# Patient Record
Sex: FEMALE | Race: WHITE | NOT HISPANIC OR LATINO | Employment: UNEMPLOYED | URBAN - METROPOLITAN AREA
[De-identification: names, ages, dates, MRNs, and addresses within clinical notes are randomized per-mention and may not be internally consistent; named-entity substitution may affect disease eponyms.]

---

## 2017-02-02 DIAGNOSIS — Z00.00 ROUTINE HEALTH MAINTENANCE: Primary | ICD-10-CM

## 2017-02-07 ENCOUNTER — CLINICAL SUPPORT (OUTPATIENT)
Dept: FAMILY MEDICINE CLINIC | Facility: CLINIC | Age: 37
End: 2017-02-07

## 2017-02-07 DIAGNOSIS — Z00.00 ROUTINE HEALTH MAINTENANCE: Primary | ICD-10-CM

## 2017-02-07 PROCEDURE — 93000 ELECTROCARDIOGRAM COMPLETE: CPT | Performed by: INTERNAL MEDICINE

## 2017-02-07 PROCEDURE — 85025 COMPLETE CBC W/AUTO DIFF WBC: CPT | Performed by: INTERNAL MEDICINE

## 2017-02-08 LAB
ALBUMIN SERPL-MCNC: 4.9 G/DL (ref 3.5–5.2)
ALBUMIN/GLOB SERPL: 2.1 G/DL
ALP SERPL-CCNC: 42 U/L (ref 39–117)
ALT SERPL-CCNC: 18 U/L (ref 1–33)
AST SERPL-CCNC: 24 U/L (ref 1–32)
BILIRUB SERPL-MCNC: 0.6 MG/DL (ref 0.1–1.2)
BUN SERPL-MCNC: 15 MG/DL (ref 6–20)
BUN/CREAT SERPL: 19 (ref 7–25)
CALCIUM SERPL-MCNC: 9.6 MG/DL (ref 8.6–10.5)
CHLORIDE SERPL-SCNC: 103 MMOL/L (ref 98–107)
CHOLEST SERPL-MCNC: 223 MG/DL (ref 0–200)
CO2 SERPL-SCNC: 26.5 MMOL/L (ref 22–29)
CREAT SERPL-MCNC: 0.79 MG/DL (ref 0.57–1)
GLOBULIN SER CALC-MCNC: 2.3 GM/DL
GLUCOSE SERPL-MCNC: 84 MG/DL (ref 65–99)
GLUCOSE UR QL: NORMAL
HDLC SERPL-MCNC: 97 MG/DL (ref 40–60)
KETONES UR QL STRIP: NORMAL
LDLC SERPL CALC-MCNC: 114 MG/DL (ref 0–100)
LDLC/HDLC SERPL: 1.18 {RATIO}
PH UR STRIP: NORMAL [PH]
POTASSIUM SERPL-SCNC: 4.4 MMOL/L (ref 3.5–5.2)
PROT SERPL-MCNC: 7.2 G/DL (ref 6–8.5)
PROT UR QL STRIP: NORMAL
REQUEST PROBLEM: NORMAL
SODIUM SERPL-SCNC: 143 MMOL/L (ref 136–145)
SP GR UR: NORMAL
TRIGL SERPL-MCNC: 59 MG/DL (ref 0–150)
VLDLC SERPL CALC-MCNC: 11.8 MG/DL (ref 5–40)

## 2017-02-14 ENCOUNTER — OFFICE VISIT (OUTPATIENT)
Dept: FAMILY MEDICINE CLINIC | Facility: CLINIC | Age: 37
End: 2017-02-14

## 2017-02-14 VITALS
TEMPERATURE: 98.2 F | RESPIRATION RATE: 16 BRPM | HEIGHT: 64 IN | DIASTOLIC BLOOD PRESSURE: 64 MMHG | WEIGHT: 130 LBS | OXYGEN SATURATION: 95 % | SYSTOLIC BLOOD PRESSURE: 116 MMHG | BODY MASS INDEX: 22.2 KG/M2 | HEART RATE: 68 BPM

## 2017-02-14 DIAGNOSIS — Z00.00 WELL ADULT EXAM: ICD-10-CM

## 2017-02-14 DIAGNOSIS — G89.29 CHRONIC PAIN OF LEFT KNEE: Primary | ICD-10-CM

## 2017-02-14 DIAGNOSIS — M25.562 CHRONIC PAIN OF LEFT KNEE: Primary | ICD-10-CM

## 2017-02-14 PROCEDURE — 99395 PREV VISIT EST AGE 18-39: CPT | Performed by: INTERNAL MEDICINE

## 2017-02-14 NOTE — PROGRESS NOTES
Subjective   Bernice Anderson is a 36 y.o. female. Patient is here today for   Chief Complaint   Patient presents with   • Annual Exam     PHYSICAL- has obgyn           Vitals:    02/14/17 1100   BP: 116/64   Pulse: 68   Resp: 16   Temp: 98.2 °F (36.8 °C)   SpO2: 95%       The following portions of the patient's history were reviewed and updated as appropriate: allergies, current medications, past family history, past medical history, past social history, past surgical history and problem list.    Past Medical History   Diagnosis Date   • Acne    • Amenorrhea       No Known Allergies   Social History     Social History   • Marital status:      Spouse name: N/A   • Number of children: N/A   • Years of education: N/A     Occupational History   • Not on file.     Social History Main Topics   • Smoking status: Never Smoker   • Smokeless tobacco: Not on file   • Alcohol use Yes   • Drug use: Not on file   • Sexual activity: Not on file     Other Topics Concern   • Not on file     Social History Narrative   • No narrative on file        Current Outpatient Prescriptions:   •  albuterol (PROVENTIL HFA;VENTOLIN HFA) 108 (90 BASE) MCG/ACT inhaler, ProAir  (90 Base) MCG/ACT Inhalation Aerosol Solution; Patient Sig: ProAir  (90 Base) MCG/ACT Inhalation Aerosol Solution INHALE 1 TO 2 PUFFS EVERY 4 TO 6 HOURS AS NEEDED.; 1; 3; 09-Apr-2015; Active; or formulary specific, Disp: , Rfl:   •  Calcium Carbonate (CALCIUM 600 PO), Take  by mouth., Disp: , Rfl:   •  fluticasone-salmeterol (ADVAIR) 100-50 MCG/DOSE DISKUS, Inhale 2 (Two) Times a Day., Disp: , Rfl:   •  Prenatal Vit-DSS-Fe Cbn-FA (PRENATAL AD) tablet, Take by mouth., Disp: , Rfl:   •  methylPREDNISolone (MEDROL DOSEPACK) 4 MG tablet, Per pack directions, Disp: 21 tablet, Rfl: 0  •  metroNIDAZOLE (METROGEL) 0.75 % gel, Apply topically., Disp: , Rfl:   •  promethazine-codeine (PHENERGAN with CODEINE) 6.25-10 MG/5ML syrup, TAKE 5 MLS BY MOUTH EVERY 6 HOURS  AS NEEDED FOR COUGH, Disp: 120 mL, Rfl: 0     EKG  ECG Report  Gunner her cardiogram shows a normal sinus rhythm with a regular rate.  She does not have any ST or T wave changes suggestive of ischemia.    She has a normal axis.    A PA and lateral chest x-ray were not indicated so they weren't done today.  Objective   HPI Comments: This 36-year-old female is new to my practice today.  She is to see Dr Muhammad.  She is here today for comprehensive physical exam.  Dr. Muhammad  moved her practice and the patient finds it is no longer convenient for her to follow-up with her.     Bernice Anderson 36 y.o. female who presents for an Annual Wellness Visit.  she has a history of   Patient Active Problem List   Diagnosis   • Allergic rhinitis   • Asthma, cough variant   • Well adult exam   .  she has been doing well with new interval problems.  Labs results discussed in detail with the patient.  Plan to update vaccines if needed today.        Lab Results (most recent)     None            Review of Systems   Constitutional: Negative.    HENT: Negative.    Respiratory: Negative.    Cardiovascular: Negative.    Genitourinary: Negative.    Musculoskeletal: Negative.         She complains of left knee pain for about a month.  She denies any crepitus in the knee.  One point she had a large amount of swelling in the popliteal fossa in this area but that seems to resolve.  She does not think that she's had any fluid on the knee per se.       Skin: Negative.    Allergic/Immunologic: Negative.    Neurological: Negative.    Hematological: Negative.    Psychiatric/Behavioral: Negative.        Physical Exam   Constitutional: She is oriented to person, place, and time. She appears well-developed and well-nourished. No distress.   HENT:   Head: Normocephalic and atraumatic.   Right Ear: External ear normal.   Left Ear: External ear normal.   Nose: Nose normal.   Mouth/Throat: Oropharynx is clear and moist. No oropharyngeal exudate.   Eyes:  Conjunctivae and EOM are normal. Pupils are equal, round, and reactive to light. Right eye exhibits no discharge. Left eye exhibits no discharge. No scleral icterus.   Neck: Normal range of motion. Neck supple. No JVD present. No tracheal deviation present. No thyromegaly present.   Cardiovascular: Normal rate, regular rhythm, normal heart sounds and intact distal pulses.  Exam reveals no gallop and no friction rub.    No murmur heard.  Pulmonary/Chest: Effort normal and breath sounds normal. No stridor. No respiratory distress. She has no wheezes. She has no rales. She exhibits no tenderness.   Abdominal: Soft. Bowel sounds are normal. She exhibits no distension and no mass. There is no tenderness. There is no rebound and no guarding. No hernia.   Genitourinary:   Genitourinary Comments: Deferred to gynecology.    She was sent home with a fecal occult blood test to accomplish at home and mail to us.   Musculoskeletal: Normal range of motion. She exhibits no edema, tenderness or deformity.   Lymphadenopathy:     She has no cervical adenopathy.   Neurological: She is alert and oriented to person, place, and time. She has normal reflexes. She displays normal reflexes. No cranial nerve deficit. She exhibits normal muscle tone. Coordination normal.   Skin: Skin is warm and dry. No rash noted. She is not diaphoretic. No erythema. No pallor.   Psychiatric: She has a normal mood and affect. Her behavior is normal. Judgment and thought content normal.   Nursing note and vitals reviewed.      ASSESSMENT       Problem List Items Addressed This Visit        Other    Well adult exam      Other Visit Diagnoses     Chronic pain of left knee    -  Primary    Relevant Orders    Ambulatory Referral to Orthopedic Surgery          PLAN  She is a very fit person.  I asked her to follow-up with her gynecologist for pelvic exam Pap smears mammograms etc. per as recommended by her gynecologist.    Her left knee hurts though there is no  abnormality on exam.  I referred her to Dr. Norberto Hwang to see what he thinks about this.  In the meantime, she may take ibuprofen or acetaminophen when necessary.    I asked her to follow-up in approximately one year.  I would like to check a lipid profile and comprehensive metabolic panel at that time.  There are no Patient Instructions on file for this visit.    No Follow-up on file.

## 2017-02-21 DIAGNOSIS — Z00.00 ROUTINE CHECK-UP: Primary | ICD-10-CM

## 2017-02-21 LAB — HEMOCCULT STL QL IA: NEGATIVE

## 2017-02-21 PROCEDURE — 82274 ASSAY TEST FOR BLOOD FECAL: CPT | Performed by: INTERNAL MEDICINE

## 2017-03-09 ENCOUNTER — OFFICE VISIT (OUTPATIENT)
Dept: ORTHOPEDIC SURGERY | Facility: CLINIC | Age: 37
End: 2017-03-09

## 2017-03-09 VITALS — BODY MASS INDEX: 21.99 KG/M2 | HEIGHT: 64 IN | TEMPERATURE: 98.6 F | WEIGHT: 128.8 LBS

## 2017-03-09 DIAGNOSIS — M25.562 PAIN AND SWELLING OF KNEE, LEFT: Primary | ICD-10-CM

## 2017-03-09 DIAGNOSIS — M22.42 CHONDROMALACIA PATELLAE OF LEFT KNEE: ICD-10-CM

## 2017-03-09 DIAGNOSIS — M25.462 PAIN AND SWELLING OF KNEE, LEFT: Primary | ICD-10-CM

## 2017-03-09 DIAGNOSIS — M23.92 INTERNAL DERANGEMENT OF KNEE JOINT, LEFT: ICD-10-CM

## 2017-03-09 PROBLEM — M23.90 INTERNAL DERANGEMENT OF KNEE JOINT: Status: ACTIVE | Noted: 2017-03-09

## 2017-03-09 PROBLEM — M25.469 PAIN AND SWELLING OF KNEE: Status: ACTIVE | Noted: 2017-03-09

## 2017-03-09 PROBLEM — M25.569 PAIN AND SWELLING OF KNEE: Status: ACTIVE | Noted: 2017-03-09

## 2017-03-09 PROCEDURE — 99203 OFFICE O/P NEW LOW 30 MIN: CPT | Performed by: ORTHOPAEDIC SURGERY

## 2017-03-09 PROCEDURE — 73562 X-RAY EXAM OF KNEE 3: CPT | Performed by: ORTHOPAEDIC SURGERY

## 2017-03-09 RX ORDER — MELATONIN
1000 DAILY
COMMUNITY
End: 2017-08-18

## 2017-03-09 NOTE — PROGRESS NOTES
"Patient:  Bernice Anderson is a 36 y.o. female    Chief Complaint/ Reason for Visit:    Chief Complaint   Patient presents with   • Left Knee - Pain       HPI:  The patient presents today complaining of a one month history of pain \"all through my left knee.\"  It seemed to started anteriorly, and it is aching in character.  It's constant whenever she is weightbearing, though she does notice it more particularly when descending stairs.  She says that she has had 2 incidents now where she had fairly sharp moderate to severe pain in the posterior aspect of her left knee that made it difficult for her to move her knee.  She had trouble straightening it out thereafter.  She is felt some popping and clicking in her left knee episodically.  She has had swelling.  She has not had any bruising or redness or other discoloration.  She is not had any calf pain, chest pain, or shortness of breath.  She has had no numbness, tingling, or sensory derangements.  She cannot describe any injury to her left knee.  She does say that both of her knees have a crunching or grinding sensation behind the kneecaps when she squats, but this has not been painful, and has been going on for years.    She does not have any known significant family history of knee problems.      PMH:    Past Medical History   Diagnosis Date   • Acne    • Amenorrhea        PSH:    Past Surgical History   Procedure Laterality Date   • Colonoscopy     • Hemorrhoidectomy         Social Hx:    Social History     Social History   • Marital status:      Spouse name: N/A   • Number of children: N/A   • Years of education: N/A     Occupational History   • Not on file.     Social History Main Topics   • Smoking status: Never Smoker   • Smokeless tobacco: Not on file   • Alcohol use Yes   • Drug use: Not on file   • Sexual activity: Not on file     Other Topics Concern   • Not on file     Social History Narrative       Family Hx:  No family history on file.    Meds:  " "  Current Outpatient Prescriptions:   •  albuterol (PROVENTIL HFA;VENTOLIN HFA) 108 (90 BASE) MCG/ACT inhaler, ProAir  (90 Base) MCG/ACT Inhalation Aerosol Solution; Patient Sig: ProAir  (90 Base) MCG/ACT Inhalation Aerosol Solution INHALE 1 TO 2 PUFFS EVERY 4 TO 6 HOURS AS NEEDED.; 1; 3; 09-Apr-2015; Active; or formulary specific, Disp: , Rfl:   •  Calcium Carbonate (CALCIUM 600 PO), Take  by mouth., Disp: , Rfl:   •  cholecalciferol (VITAMIN D3) 1000 UNITS tablet, Take 1,000 Units by mouth Daily., Disp: , Rfl:   •  fluticasone-salmeterol (ADVAIR) 100-50 MCG/DOSE DISKUS, Inhale 2 (Two) Times a Day., Disp: , Rfl:   •  methylPREDNISolone (MEDROL DOSEPACK) 4 MG tablet, Per pack directions, Disp: 21 tablet, Rfl: 0  •  Prenatal Vit-DSS-Fe Cbn-FA (PRENATAL AD) tablet, Take by mouth., Disp: , Rfl:   •  promethazine-codeine (PHENERGAN with CODEINE) 6.25-10 MG/5ML syrup, TAKE 5 MLS BY MOUTH EVERY 6 HOURS AS NEEDED FOR COUGH, Disp: 120 mL, Rfl: 0  •  metroNIDAZOLE (METROGEL) 0.75 % gel, Apply topically., Disp: , Rfl:     Allergies:  No Known Allergies    ROS:  Review of Systems   Constitutional: Negative.    Eyes: Negative.    Respiratory: Positive for cough.    Cardiovascular: Negative.    Gastrointestinal: Negative.    Endocrine: Negative.    Genitourinary: Negative.    Musculoskeletal: Positive for arthralgias, gait problem and joint swelling.   Skin: Negative.    Allergic/Immunologic: Negative.    Neurological: Positive for headaches.   Hematological: Negative.    Psychiatric/Behavioral: Negative.        Vitals:    03/09/17 0946   Temp: 98.6 °F (37 °C)   Weight: 128 lb 12.8 oz (58.4 kg)   Height: 64\" (162.6 cm)       Physical Exam    The patient is awake, alert, and oriented ×3.  The patient is in no acute distress.  Breathing is regular and unlabored with a respiratory rate of 12/m.  Extraocular movements and pupillary responses are symmetrically intact. Sclerae are anicteric.   Hearing is within normal " limits.  Speech is within normal limits.  There is no jugular venous distention.    She arises readily from a seated position and a standard height chair.  She has perhaps just the slightest antalgia from the left on inspection of her gait.  She has perhaps a very, very mild effusion on the left knee, but no abnormal warmth, bruising, or other discoloration.  However, she has an extremely sharp, positive bounce test for reproduction of posterior pain in the left knee.  I cannot elicit any obvious tenderness on direct palpation.  I do not feel any popliteal lymphadenopathy.  The patient also has posterior pain on Neva test.  The patient also has reproducible clicking on gentle flexion and extension of the knee that radiates down the tibia so that I can actually feel it with my left hand when examining the knee in my right hand is there on.  She has tenderness along the medial joint line.  She has mild discomfort on patella grind in hyperflexion in the left knee.  Apprehension test is negative.  Lachman, anterior and posterior drawer, and varus and valgus stressing are all negative for any instability.    She has no calf tenderness.  She has no popliteal or inguinal lymphadenopathy in either lower extremity.  She has palpable, regular pedal pulses symmetrically present in both feet with a current heart rate of about 72 bpm.  Skin and nails are unremarkable.  Sensory exam intact and normal symmetrically in both feet.  Motor strength is 5 over 5 throughout both lower extremities.      Radiology: X-rays: AP, lateral, and sunrise views of the patient's left knee with incidental views of the right knee was ordered and reviewed today to assess the patient's complaints of pain and swelling in the left knee.  I do not see any obvious acute osseous or articular abnormalities on these images.  The patient may have the tiniest bit of spurring on the superior pole the patella.      Assessment:  1. Pain and swelling of knee,  left    - XR Knee 1 or 2 View Left  - XR Knee 1 or 2 View Bilateral  - MRI Knee Left Without Contrast; Future    2. Internal derangement of knee joint, left    - MRI Knee Left Without Contrast; Future    3. Chondromalacia patellae of left knee            Plan:   I discussed everything with the patient at length.  I reviewed the important points of her history and physical examination.  I related my concern that she may have a posterior meniscus tear.  I think is some of her symptoms are related to chondromalacia patellae/patellofemoral dysfunction, but her posterior symptoms and their intensity as well as the markedly positive bounce test all rays great concern for meniscus tear.  The patient voiced understanding.    We had a detailed discussion regarding activity recommendations and precautions.  All of her questions were answered her satisfaction and she voiced understanding.    She'll follow-up once the MRI of her left knee has been completed and interpreted.      Orders Placed This Encounter   Procedures   • XR Knee 1 or 2 View Left     Order Specific Question:   Reason for Exam:     Answer:   IOV LT. KNEE     Order Specific Question:   Is the patient pregnant?     Answer:   No   • XR Knee 1 or 2 View Bilateral     Order Specific Question:   Reason for Exam:     Answer:   IOV LT. KNEE     Order Specific Question:   Is the patient pregnant?     Answer:   No   • MRI Knee Left Without Contrast     Standing Status:   Future     Standing Expiration Date:   3/9/2018     Order Specific Question:   Reason for Exam:     Answer:   The patient has a history of clicking and mechanical symptoms, with a feeling of pain and tightness posteriorly.  On exam, she has a sharply positive bounce test, and has reproducible mechanical clicking.     Order Specific Question:   Is the patient pregnant?     Answer:   No

## 2017-03-14 ENCOUNTER — OFFICE VISIT (OUTPATIENT)
Dept: ORTHOPEDIC SURGERY | Facility: CLINIC | Age: 37
End: 2017-03-14

## 2017-03-14 DIAGNOSIS — M25.462 PAIN AND SWELLING OF KNEE, LEFT: ICD-10-CM

## 2017-03-14 DIAGNOSIS — M23.92 INTERNAL DERANGEMENT OF KNEE JOINT, LEFT: ICD-10-CM

## 2017-03-14 DIAGNOSIS — M25.562 PAIN AND SWELLING OF KNEE, LEFT: ICD-10-CM

## 2017-03-14 PROCEDURE — 73721 MRI JNT OF LWR EXTRE W/O DYE: CPT | Performed by: ORTHOPAEDIC SURGERY

## 2017-03-16 DIAGNOSIS — J45.991 ASTHMA, COUGH VARIANT: ICD-10-CM

## 2017-03-17 ENCOUNTER — TELEPHONE (OUTPATIENT)
Dept: ORTHOPEDIC SURGERY | Facility: CLINIC | Age: 37
End: 2017-03-17

## 2017-03-20 ENCOUNTER — TELEPHONE (OUTPATIENT)
Dept: ORTHOPEDIC SURGERY | Facility: CLINIC | Age: 37
End: 2017-03-20

## 2017-03-20 NOTE — TELEPHONE ENCOUNTER
I called the patient just now.  I explained the findings on the MRI of her knee.  I explained that I did not feel that this was absolutely a surgical problem.  We discussed the findings, and I explained to the patient that I was going to ponder the options, and explained that she should keep her appointment to review the findings with me in person such that I may also involve additional history and physical examination of her in our decision-making process.    We also discussed activities to avoid, activity she could try for fitness, etc.    She voiced understanding all follow-up on March 29 to schedule.    Norberto Hwang MD  03/20/17  5:14 PM

## 2017-03-27 DIAGNOSIS — J45.991 ASTHMA, COUGH VARIANT: ICD-10-CM

## 2017-03-29 ENCOUNTER — OFFICE VISIT (OUTPATIENT)
Dept: ORTHOPEDIC SURGERY | Facility: CLINIC | Age: 37
End: 2017-03-29

## 2017-03-29 VITALS — TEMPERATURE: 97.9 F | WEIGHT: 129.2 LBS | BODY MASS INDEX: 21.52 KG/M2 | HEIGHT: 65 IN

## 2017-03-29 DIAGNOSIS — M23.92 INTERNAL DERANGEMENT OF KNEE JOINT, LEFT: ICD-10-CM

## 2017-03-29 DIAGNOSIS — M25.462 PAIN AND SWELLING OF KNEE, LEFT: ICD-10-CM

## 2017-03-29 DIAGNOSIS — M25.562 PAIN AND SWELLING OF KNEE, LEFT: ICD-10-CM

## 2017-03-29 DIAGNOSIS — S83.242A OTHER TEAR OF MEDIAL MENISCUS OF LEFT KNEE AS CURRENT INJURY, INITIAL ENCOUNTER: Primary | ICD-10-CM

## 2017-03-29 PROBLEM — S83.209A CURRENT TEAR OF MENISCUS OF KNEE: Status: ACTIVE | Noted: 2017-03-29

## 2017-03-29 PROCEDURE — 99213 OFFICE O/P EST LOW 20 MIN: CPT | Performed by: ORTHOPAEDIC SURGERY

## 2017-03-29 NOTE — PROGRESS NOTES
Patient:  Bernice Anderson is a 36 y.o. female    Chief Complaint/ Reason for Visit:    Chief Complaint   Patient presents with   • Left Knee - Follow-up, Pain       HPI:  Patient is here for scheduled follow-up on her left knee.  She is still complaining of intermittent mild to moderate pain in the anterior aspect of her left knee.  Since she was last here, the posterior component of the pain seems to have become less prevalent.  Rest has seemed to help, as well as avoidance of aggressive athletic activities.  She has also been using the brace on her left knee, which has seemed to help.    She does tell me that she has had some anterior pain episodically in her right knee, and uses a patella tendon strap for that, and that helps also.      PMH:    Past Medical History:   Diagnosis Date   • Acne    • Amenorrhea        PSH:    Past Surgical History:   Procedure Laterality Date   • COLONOSCOPY     • HEMORRHOIDECTOMY         Social Hx:    Social History     Social History   • Marital status:      Spouse name: N/A   • Number of children: N/A   • Years of education: N/A     Occupational History   • Not on file.     Social History Main Topics   • Smoking status: Never Smoker   • Smokeless tobacco: Not on file   • Alcohol use Yes   • Drug use: Not on file   • Sexual activity: Defer     Other Topics Concern   • Not on file     Social History Narrative       Family Hx:  History reviewed. No pertinent family history.    Meds:    Current Outpatient Prescriptions:   •  ADVAIR DISKUS 250-50 MCG/DOSE DISKUS, INHALE 1 PUFF 2 (TWO) TIMES A DAY, Disp: 180 each, Rfl: 0  •  Calcium Carbonate (CALCIUM 600 PO), Take  by mouth., Disp: , Rfl:   •  cholecalciferol (VITAMIN D3) 1000 UNITS tablet, Take 1,000 Units by mouth Daily., Disp: , Rfl:   •  metroNIDAZOLE (METROGEL) 0.75 % gel, Apply topically., Disp: , Rfl:   •  Prenatal Vit-DSS-Fe Cbn-FA (PRENATAL AD) tablet, Take by mouth., Disp: , Rfl:   •  ADVAIR DISKUS 250-50 MCG/DOSE  "DISKUS, INHALE 1 PUFF 2 (TWO) TIMES A DAY, Disp: 180 each, Rfl: 0  •  albuterol (PROVENTIL HFA;VENTOLIN HFA) 108 (90 BASE) MCG/ACT inhaler, ProAir  (90 Base) MCG/ACT Inhalation Aerosol Solution; Patient Sig: ProAir  (90 Base) MCG/ACT Inhalation Aerosol Solution INHALE 1 TO 2 PUFFS EVERY 4 TO 6 HOURS AS NEEDED.; 1; 3; 09-Apr-2015; Active; or formulary specific, Disp: , Rfl:   •  fluticasone-salmeterol (ADVAIR) 100-50 MCG/DOSE DISKUS, Inhale 1 puff 2 (Two) Times a Day., Disp: 60 each, Rfl: 2  •  methylPREDNISolone (MEDROL DOSEPACK) 4 MG tablet, Per pack directions, Disp: 21 tablet, Rfl: 0  •  promethazine-codeine (PHENERGAN with CODEINE) 6.25-10 MG/5ML syrup, TAKE 5 MLS BY MOUTH EVERY 6 HOURS AS NEEDED FOR COUGH, Disp: 120 mL, Rfl: 0    Allergies:  No Known Allergies    ROS:  Review of Systems    Vitals:    03/29/17 1524   Temp: 97.9 °F (36.6 °C)   TempSrc: Temporal Artery    Weight: 129 lb 3.2 oz (58.6 kg)   Height: 64.5\" (163.8 cm)       Physical Exam    The patient is awake, alert, and oriented ×3.  The patient is in no acute distress.  Breathing is regular and unlabored with a respiratory rate of 12/m.  Extraocular movements and pupillary responses are symmetrically intact. Sclerae are anicteric.   Hearing is within normal limits.  Speech is within normal limits.  There is no jugular venous distention.    She arises readily from a seated position and a standard height chair.  Today, her gait is nonantalgic. She has perhaps a very, very mild effusion on the left knee, but no abnormal warmth, bruising, or other discoloration.  Bounce test is minimally positive on exam today.. I cannot elicit any obvious tenderness on direct palpation. I do not feel any popliteal lymphadenopathy. The patient also has posterior pain on Neva test. The patient also has reproducible clicking on gentle flexion and extension of the knee that radiates down the tibia so that I can actually feel it with my left hand when " examining the knee in my right hand is there on. She has no tibiofemoral joint line tenderness at this time. She has mild discomfort on patella grind in hyperflexion in the left knee. Apprehension test is negative. Lachman, anterior and posterior drawer, and varus and valgus stressing are all negative for any instability.     She has no calf tenderness. She has no popliteal or inguinal lymphadenopathy in either lower extremity. She has palpable, regular pedal pulses symmetrically present in both feet with a current heart rate of about 72 bpm. Skin and nails are unremarkable. Sensory exam intact and normal symmetrically in both feet. Motor strength is 5 over 5 throughout both lower extremities.      Radiology: MRI left knee: I reviewed the images and reviewed the radiologist's report.  The patient has some signal changes in the posterior horn root of the medial meniscus consistent with a possible injury in this area.  There appears to be a trace effusion.  I don't see any other obvious abnormalities.  Comparison MRI was not available.  I did take another look at her plain x-rays.          Assessment:  1. Other tear of medial meniscus of left knee as current injury, initial encounter    - Ambulatory Referral to Physical Therapy Evaluate and treat, Ortho    2. Pain and swelling of knee, left    - Ambulatory Referral to Physical Therapy Evaluate and treat, Ortho    3. Internal derangement of knee joint, left    - Ambulatory Referral to Physical Therapy Evaluate and treat, Ortho        Plan:  I had a long and detailed discussion with the patient regarding her history, her physical exam, and the MRI findings.  I used a 3-dimensional model of the knee to explain the anatomy and the pathology to her in great detail.  I answered all of her questions, and she voiced understanding of our entire discussion.    We discussed the options for treatment.  I explained that it was not absolutely certain that she had a meniscus tear,  and also explained that, in the face of her improvement, and tacked that she persistently complains primarily of anterior knee pain, I felt we should proceed with nonsurgical management.  I think the mainstay of her treatment needs to be physical therapy, though I think she will also continue to benefit from the use of the braces that she has.  She agrees.  I explained that if she had a meniscus tear, it may not heal, and at some point we may need to treat it surgically.  However, we discussed that given the location and size of the tear, as well as the fact that the MRI was not conclusive, I felt nonsurgical management was reasonable.  She agrees.  We did discuss that there was a small chance that, if that was a meniscus tear, it might not heal and could, with time, become larger.  She says she understands this.  She also understands that she may not improve adequately with therapy, and that surgery in the form of an arthroscopy may be necessary.    She is given a detailed referral for physical therapy at [facility].  I want to see her back in about 6 weeks for progress check.  All of her questions regarding her activities and other aspects of her care were answered to her complete satisfaction, and she voiced understanding.      Orders Placed This Encounter   Procedures   • Ambulatory Referral to Physical Therapy Evaluate and treat, Ortho     Referral Priority:   Routine     Referral Type:   Therapy     Referral Reason:   Specialty Services Required     Requested Specialty:   Physical Therapy     Number of Visits Requested:   1

## 2017-03-31 ENCOUNTER — TREATMENT (OUTPATIENT)
Dept: PHYSICAL THERAPY | Facility: CLINIC | Age: 37
End: 2017-03-31

## 2017-03-31 DIAGNOSIS — G89.29 CHRONIC PAIN OF LEFT KNEE: ICD-10-CM

## 2017-03-31 DIAGNOSIS — S83.242D OTHER TEAR OF MEDIAL MENISCUS OF LEFT KNEE AS CURRENT INJURY, SUBSEQUENT ENCOUNTER: Primary | ICD-10-CM

## 2017-03-31 DIAGNOSIS — M25.562 CHRONIC PAIN OF LEFT KNEE: ICD-10-CM

## 2017-03-31 PROCEDURE — 97110 THERAPEUTIC EXERCISES: CPT | Performed by: PHYSICAL THERAPIST

## 2017-03-31 PROCEDURE — 97161 PT EVAL LOW COMPLEX 20 MIN: CPT | Performed by: PHYSICAL THERAPIST

## 2017-03-31 NOTE — PATIENT INSTRUCTIONS
Please view [facility] Bernice Anderson for a complete list of HEP instructions.  A&P of symptoms.   PT course of care, treatment outcomes.   Continued use of ice

## 2017-04-04 ENCOUNTER — TREATMENT (OUTPATIENT)
Dept: PHYSICAL THERAPY | Facility: CLINIC | Age: 37
End: 2017-04-04

## 2017-04-04 DIAGNOSIS — G89.29 CHRONIC PAIN OF LEFT KNEE: ICD-10-CM

## 2017-04-04 DIAGNOSIS — S83.242D OTHER TEAR OF MEDIAL MENISCUS OF LEFT KNEE AS CURRENT INJURY, SUBSEQUENT ENCOUNTER: Primary | ICD-10-CM

## 2017-04-04 DIAGNOSIS — M25.562 CHRONIC PAIN OF LEFT KNEE: ICD-10-CM

## 2017-04-04 PROCEDURE — 97110 THERAPEUTIC EXERCISES: CPT | Performed by: PHYSICAL THERAPIST

## 2017-04-04 PROCEDURE — 97112 NEUROMUSCULAR REEDUCATION: CPT | Performed by: PHYSICAL THERAPIST

## 2017-04-04 PROCEDURE — 97530 THERAPEUTIC ACTIVITIES: CPT | Performed by: PHYSICAL THERAPIST

## 2017-04-04 NOTE — PATIENT INSTRUCTIONS
Reviewed appropriate gym exercises.   Please view [facility] Benrice Anderson for a complete list of HEP instructions.

## 2017-04-04 NOTE — PROGRESS NOTES
Physical Therapy Daily Progress Note      Bernice Anderson reports: compliance with HEP.  Reports pain persists with going down stairs.       Objective  See Exercise, Manual, and Modality Logs for complete treatment.     Assessment/Plan  Pt complains of right anterior knee pain with step downs secondary to quadricep tightness. Pt was instructed to perform prone quad stretch and eccentric step downs to promote quadricep lengthening. Pt also performed deep squats in the pain free range, with focus on eccentric lowering and instructions to bear weight through the heels of her feet and keep feet shoulder width apart. Discussed gym exercise routine with pt, promoting the use of a leg press machine and dead lifts to strengthen quads and gluteal muscles and to avoid using leg extension machine that would stress the knee.   Progress per Plan of Care     Therapy Exercise 14657 15 minutes, NMR 50373 10 minutes and Ther Act 26011 15 minutes    Timed Code Treatment: 40  Minutes     Total Treatment Time: 45    Minutes    Fouzia Paez, PT, DPT  Physical Therapist #[id number]

## 2017-04-11 ENCOUNTER — TREATMENT (OUTPATIENT)
Dept: PHYSICAL THERAPY | Facility: CLINIC | Age: 37
End: 2017-04-11

## 2017-04-11 DIAGNOSIS — G89.29 CHRONIC PAIN OF LEFT KNEE: ICD-10-CM

## 2017-04-11 DIAGNOSIS — S83.242D OTHER TEAR OF MEDIAL MENISCUS OF LEFT KNEE AS CURRENT INJURY, SUBSEQUENT ENCOUNTER: Primary | ICD-10-CM

## 2017-04-11 DIAGNOSIS — M25.562 CHRONIC PAIN OF LEFT KNEE: ICD-10-CM

## 2017-04-11 PROCEDURE — 97110 THERAPEUTIC EXERCISES: CPT | Performed by: PHYSICAL THERAPIST

## 2017-04-11 NOTE — PROGRESS NOTES
"   Physical Therapy Daily Progress Note      Bernice Anderson reports: she went to the gym yesterday, did elliptical, leg press, and tried the squats but felt \"twingy\" in the left knee.   Pain scale: 0     Objective     Functional Assessment   Squat   Left valgus and right valgus.     See Exercise, Manual, and Modality Logs for complete treatment.     Assessment/Plan  Pt did report \"twinge\" half way through squat repetitions, however did improve with verbal cueing for technique.  Pt performed all other strengthening exercises with no increased pain. Instructed pt to perform side steps with band prior to performing squats and leg strengthening exercises at the gym, to ensure gluteal recruitment and decrease knee pain with squats.  Pt would benefit from additional skilled PT to continue to decrease pain.    Progress strengthening /stabilization /functional activity    Therapy Exercise 88130 30 minutes    Timed Code Treatment: 30   Minutes     Total Treatment Time: 35      Minutes    Fouzia Paez, PT, DPT  Physical Therapist #[id number]        "

## 2017-04-13 ENCOUNTER — TREATMENT (OUTPATIENT)
Dept: PHYSICAL THERAPY | Facility: CLINIC | Age: 37
End: 2017-04-13

## 2017-04-13 DIAGNOSIS — M25.562 CHRONIC PAIN OF LEFT KNEE: ICD-10-CM

## 2017-04-13 DIAGNOSIS — G89.29 CHRONIC PAIN OF LEFT KNEE: ICD-10-CM

## 2017-04-13 DIAGNOSIS — S83.242D OTHER TEAR OF MEDIAL MENISCUS OF LEFT KNEE AS CURRENT INJURY, SUBSEQUENT ENCOUNTER: Primary | ICD-10-CM

## 2017-04-13 PROCEDURE — 97110 THERAPEUTIC EXERCISES: CPT | Performed by: PHYSICAL THERAPIST

## 2017-04-13 NOTE — PROGRESS NOTES
Physical Therapy Daily Progress Note      Bernice AN Monica reports: she was able to do her spin class yesterday with no increased pain and no residual pain.  Pt denies pain since last visit, no pain going up and down stairs.   Pain scale: 0     Objective  See Exercise, Manual, and Modality Logs for complete treatment.     Assessment/Plan  Pt was able to perform forward and backwards lunging with no increased pain.  Pt performed weighted squats, reported only mild anterior knee pain towards the end of the second set. Pt is progressing well towards goals, would benefit from additional skilled PT to continue to progress LE strength and ensure pain free ADLs and IADLs.   Progress strengthening /stabilization /functional activity    Therapy Exercise 97512 30 minutes    Timed Code Treatment: 30   Minutes     Total Treatment Time: 30      Minutes    Fouzia Paez, PT, DPT  Physical Therapist #[id number]

## 2017-04-18 ENCOUNTER — TREATMENT (OUTPATIENT)
Dept: PHYSICAL THERAPY | Facility: CLINIC | Age: 37
End: 2017-04-18

## 2017-04-18 DIAGNOSIS — M25.562 CHRONIC PAIN OF LEFT KNEE: ICD-10-CM

## 2017-04-18 DIAGNOSIS — G89.29 CHRONIC PAIN OF LEFT KNEE: ICD-10-CM

## 2017-04-18 DIAGNOSIS — S83.242D OTHER TEAR OF MEDIAL MENISCUS OF LEFT KNEE AS CURRENT INJURY, SUBSEQUENT ENCOUNTER: Primary | ICD-10-CM

## 2017-04-18 PROCEDURE — 97140 MANUAL THERAPY 1/> REGIONS: CPT | Performed by: PHYSICAL THERAPIST

## 2017-04-18 PROCEDURE — 97110 THERAPEUTIC EXERCISES: CPT | Performed by: PHYSICAL THERAPIST

## 2017-04-18 NOTE — PROGRESS NOTES
Physical Therapy Daily Progress Note      Bernice AN Monica reports: compliance with HEP every other day, her legs are sore as a result.  Reports she only gets knee pain with side step downs-at the front of the knee after increasing repetitions.      Objective  See Exercise, Manual, and Modality Logs for complete treatment.     Assessment/Plan  Pt performed eccentric side step downs with no increased pain with VCs and TCs to ensure more gluteal muscle recruitment with decreased patellofemoral joint stress.  Pt demonstrates excellent progress towards goals, instructed pt in continued HEP.  Pt would benefit from additional skilled PT to continue to decrease knee pain and progress LE strength.   Progress per Plan of Care-likely d/c to HEP next visit    Therapy Exercise 99982 20 minutes and NMR 06375 10 minutes    Timed Code Treatment: 30   Minutes     Total Treatment Time: 35      Minutes    Fouzia Paez, PT, DPT  Physical Therapist #[id number]

## 2017-04-23 DIAGNOSIS — J45.991 ASTHMA, COUGH VARIANT: ICD-10-CM

## 2017-04-25 ENCOUNTER — TREATMENT (OUTPATIENT)
Dept: PHYSICAL THERAPY | Facility: CLINIC | Age: 37
End: 2017-04-25

## 2017-04-25 DIAGNOSIS — M25.562 CHRONIC PAIN OF LEFT KNEE: ICD-10-CM

## 2017-04-25 DIAGNOSIS — G89.29 CHRONIC PAIN OF LEFT KNEE: ICD-10-CM

## 2017-04-25 DIAGNOSIS — S83.242D OTHER TEAR OF MEDIAL MENISCUS OF LEFT KNEE AS CURRENT INJURY, SUBSEQUENT ENCOUNTER: Primary | ICD-10-CM

## 2017-04-25 PROCEDURE — 97110 THERAPEUTIC EXERCISES: CPT | Performed by: PHYSICAL THERAPIST

## 2017-04-25 PROCEDURE — 97112 NEUROMUSCULAR REEDUCATION: CPT | Performed by: PHYSICAL THERAPIST

## 2017-04-25 NOTE — PROGRESS NOTES
Physical Therapy Daily Progress Note      Bernice AN Monica reports: she still has pain in the back of the knee when she kneels on the knee. Pt is also inquiring about a return to kickboxing.   Pain scale: 0     Objective  See Exercise, Manual, and Modality Logs for complete treatment.     Assessment/Plan  Pt performed all progressed single leg strengthening with no increased pain, reviewed long term HEP.  Discussed mechanics of burpees and kickboxing on the knee and ways to decreased knee joint stress with the two activities.  Pt has met all STGs and 3/4 LTGs.    Progress per Plan of Care-1x next week if needed, pt to cancel visit if she continues to have decreased pain    Therapy Exercise 34320 30 minutes and NMR 06248 8 minutes    Timed Code Treatment: 38   Minutes     Total Treatment Time: 40      Minutes    Fouzia Paez, PT, DPT  Physical Therapist #[id number]

## 2017-04-25 NOTE — PATIENT INSTRUCTIONS
Please view [facility] Bernice Anderson for a complete list of HEP instructions.  Glute activating warm up prior to kick boxing class.  Ways to decrease knee joint stress with burpees and kick boxing

## 2017-05-12 ENCOUNTER — OFFICE VISIT (OUTPATIENT)
Dept: FAMILY MEDICINE CLINIC | Facility: CLINIC | Age: 37
End: 2017-05-12

## 2017-05-12 VITALS
HEART RATE: 54 BPM | OXYGEN SATURATION: 98 % | HEIGHT: 65 IN | BODY MASS INDEX: 21.23 KG/M2 | RESPIRATION RATE: 16 BRPM | WEIGHT: 127.4 LBS | SYSTOLIC BLOOD PRESSURE: 100 MMHG | TEMPERATURE: 98.1 F | DIASTOLIC BLOOD PRESSURE: 80 MMHG

## 2017-05-12 DIAGNOSIS — J32.9 RHINOSINUSITIS: ICD-10-CM

## 2017-05-12 DIAGNOSIS — J20.9 ACUTE BRONCHITIS, UNSPECIFIED ORGANISM: Primary | ICD-10-CM

## 2017-05-12 DIAGNOSIS — J31.0 RHINOSINUSITIS: ICD-10-CM

## 2017-05-12 PROCEDURE — 99213 OFFICE O/P EST LOW 20 MIN: CPT | Performed by: NURSE PRACTITIONER

## 2017-05-12 RX ORDER — AZITHROMYCIN 250 MG/1
TABLET, FILM COATED ORAL
Qty: 6 TABLET | Refills: 0 | Status: SHIPPED | OUTPATIENT
Start: 2017-05-12 | End: 2017-08-18

## 2017-05-12 RX ORDER — PROMETHAZINE HYDROCHLORIDE AND CODEINE PHOSPHATE 6.25; 1 MG/5ML; MG/5ML
5 SYRUP ORAL EVERY 4 HOURS PRN
Qty: 180 ML | Refills: 0 | Status: SHIPPED | OUTPATIENT
Start: 2017-05-12 | End: 2018-02-22

## 2017-07-27 DIAGNOSIS — J45.991 ASTHMA, COUGH VARIANT: ICD-10-CM

## 2017-08-18 ENCOUNTER — OFFICE VISIT (OUTPATIENT)
Dept: FAMILY MEDICINE CLINIC | Facility: CLINIC | Age: 37
End: 2017-08-18

## 2017-08-18 VITALS
OXYGEN SATURATION: 97 % | SYSTOLIC BLOOD PRESSURE: 100 MMHG | TEMPERATURE: 98.3 F | DIASTOLIC BLOOD PRESSURE: 80 MMHG | BODY MASS INDEX: 21.49 KG/M2 | WEIGHT: 129 LBS | HEART RATE: 57 BPM | RESPIRATION RATE: 16 BRPM | HEIGHT: 65 IN

## 2017-08-18 DIAGNOSIS — H92.01 OTALGIA OF RIGHT EAR: Primary | ICD-10-CM

## 2017-08-18 DIAGNOSIS — R10.30 LOWER ABDOMINAL PAIN: ICD-10-CM

## 2017-08-18 LAB
BILIRUB BLD-MCNC: NEGATIVE MG/DL
CLARITY, POC: CLEAR
COLOR UR: YELLOW
GLUCOSE UR STRIP-MCNC: NEGATIVE MG/DL
KETONES UR QL: NEGATIVE
LEUKOCYTE EST, POC: NEGATIVE
NITRITE UR-MCNC: NEGATIVE MG/ML
PH UR: 6.5 [PH] (ref 5–8)
PROT UR STRIP-MCNC: NEGATIVE MG/DL
RBC # UR STRIP: NEGATIVE /UL
SP GR UR: 1.01 (ref 1–1.03)
UROBILINOGEN UR QL: NORMAL

## 2017-08-18 PROCEDURE — 81003 URINALYSIS AUTO W/O SCOPE: CPT | Performed by: NURSE PRACTITIONER

## 2017-08-18 PROCEDURE — 85025 COMPLETE CBC W/AUTO DIFF WBC: CPT | Performed by: NURSE PRACTITIONER

## 2017-08-18 PROCEDURE — 99214 OFFICE O/P EST MOD 30 MIN: CPT | Performed by: NURSE PRACTITIONER

## 2017-08-18 NOTE — PROGRESS NOTES
Subjective   Bernice Anderson is a 36 y.o. female.   Chief Complaint   Patient presents with   • Earache     pt states right ear pain, has been going on since last friday    • neck tenderness     pt states upper part by right ear    • Abdominal Pain     Vitals:    08/18/17 1108   BP: 100/80   Pulse: 57   Resp: 16   Temp: 98.3 °F (36.8 °C)   SpO2: 97%     Patient's last menstrual period was 08/14/2017.    History of Present Illness  Bernice is here for an acute visit. She is a patient of Dr Verde. She c/o right ear pain for a week. She denies drainage, tinnitus, or difficulty hearing. She states that she has pain in her neck just below her ear.  She has a history of allergic rhinitis.   She also c/o lower abdominal pain for the past few days. Her daughter has had the same symptoms. She has soft stool but no constipation or diarrhea. She also states her stomach has been gurgling. She returned from [place] a week ago. She denies fever, chills or body aches.     The following portions of the patient's history were reviewed and updated as appropriate: allergies, current medications, past family history, past medical history, past social history, past surgical history and problem list.    Review of Systems   Constitutional: Negative for diaphoresis, fatigue and fever.   HENT: Positive for ear pain. Negative for congestion, ear discharge and tinnitus.    Respiratory: Negative.    Cardiovascular: Negative.    Gastrointestinal: Positive for abdominal pain and nausea. Negative for blood in stool, constipation and vomiting.   Genitourinary: Positive for frequency. Negative for dysuria and flank pain.   Musculoskeletal: Negative for arthralgias.       Objective   Physical Exam   Constitutional: Vital signs are normal. She appears well-developed and well-nourished. No distress.   HENT:   Right Ear: No drainage, swelling or tenderness. No mastoid tenderness. Tympanic membrane is bulging. Tympanic membrane is not injected and not  erythematous. No decreased hearing is noted.   Left Ear: Tympanic membrane, external ear and ear canal normal.   Nose: Mucosal edema (inflammed nasal mucosa ) and rhinorrhea present.   Neck: Trachea normal.   Cardiovascular: Normal rate and regular rhythm.    Pulmonary/Chest: Effort normal and breath sounds normal.   Abdominal: Soft. Normal appearance and bowel sounds are normal. There is tenderness in the right lower quadrant and left lower quadrant.   Neurological: She is alert.   Skin: Skin is warm and dry.   Psychiatric: She has a normal mood and affect.     Brief Urine Lab Results  (Last result in the past 365 days)      Color   Clarity   Blood   Leuk Est   Nitrite   Protein   CREAT   Urine HCG        08/18/17 1150 Yellow Clear Negative Negative Negative Negative               Assessment/Plan   Bernice was seen today for earache, neck tenderness and abdominal pain.    Diagnoses and all orders for this visit:    Otalgia of right ear    Lower abdominal pain  -     POC CBC With / Auto Diff  -     POC Urinalysis Dipstick, Automated      Cbc normal   Urine was normal  Suggest probiotic and bland diet for a few days  Push fluids  Start flonase  Follow up if symptoms persist, worsen, or if you develop new symptoms   Advised to go to the Er for worsening abdominal pain or high fever

## 2017-10-05 DIAGNOSIS — J45.991 ASTHMA, COUGH VARIANT: ICD-10-CM

## 2017-11-14 ENCOUNTER — OFFICE VISIT (OUTPATIENT)
Dept: FAMILY MEDICINE CLINIC | Facility: CLINIC | Age: 37
End: 2017-11-14

## 2017-11-14 VITALS
OXYGEN SATURATION: 98 % | BODY MASS INDEX: 21.59 KG/M2 | WEIGHT: 129.6 LBS | SYSTOLIC BLOOD PRESSURE: 106 MMHG | HEIGHT: 65 IN | DIASTOLIC BLOOD PRESSURE: 70 MMHG | TEMPERATURE: 97.9 F | HEART RATE: 67 BPM

## 2017-11-14 VITALS — SYSTOLIC BLOOD PRESSURE: 106 MMHG | RESPIRATION RATE: 16 BRPM | HEART RATE: 67 BPM | DIASTOLIC BLOOD PRESSURE: 70 MMHG

## 2017-11-14 DIAGNOSIS — H65.02 ACUTE SEROUS OTITIS MEDIA OF LEFT EAR, RECURRENCE NOT SPECIFIED: Primary | ICD-10-CM

## 2017-11-14 DIAGNOSIS — Z02.82 PRE-ADOPTION INTERVIEW: Primary | ICD-10-CM

## 2017-11-14 PROCEDURE — 90471 IMMUNIZATION ADMIN: CPT | Performed by: NURSE PRACTITIONER

## 2017-11-14 PROCEDURE — 90630 INFLUENZA VAC INTRADERMAL QUADRIVALENT: CPT | Performed by: NURSE PRACTITIONER

## 2017-11-14 PROCEDURE — 99213 OFFICE O/P EST LOW 20 MIN: CPT | Performed by: NURSE PRACTITIONER

## 2017-11-14 PROCEDURE — 99395 PREV VISIT EST AGE 18-39: CPT | Performed by: NURSE PRACTITIONER

## 2017-11-14 NOTE — PROGRESS NOTES
Subjective   Bernice Anderson is a 36 y.o. female. Patient is here today for   Chief Complaint   Patient presents with   • Earache     lt ear pain not constant just when sometimes swollowing, turning her neck          Vitals:    11/14/17 1315   BP: 106/70   Pulse: 67   Temp: 97.9 °F (36.6 °C)   SpO2: 98%     The following portions of the patient's history were reviewed and updated as appropriate: allergies, current medications, past family history, past medical history, past social history, past surgical history and problem list.    Past Medical History:   Diagnosis Date   • Acne    • Amenorrhea       No Known Allergies   Social History     Social History   • Marital status:      Spouse name: N/A   • Number of children: N/A   • Years of education: N/A     Occupational History   • Not on file.     Social History Main Topics   • Smoking status: Never Smoker   • Smokeless tobacco: Never Used   • Alcohol use Yes   • Drug use: No   • Sexual activity: Defer     Other Topics Concern   • Not on file     Social History Narrative        Current Outpatient Prescriptions:   •  ADVAIR DISKUS 250-50 MCG/DOSE DISKUS, INHALE 1 PUFF 2 (TWO) TIMES [place] DAY[place] [id number], Disp: 180 each, Rfl: 0  •  Prenatal Vit-DSS-Fe Cbn-FA (PRENATAL AD) tablet, Take by mouth., Disp: , Rfl:   •  PROAIR  (90 BASE) MCG/ACT inhaler, INHALE 1 TO 2 PUFFS EVERY 4 TO 6 HOURS AS NEEDED., Disp: 8.5 inhaler, Rfl: 0  •  promethazine-codeine (PHENERGAN with CODEINE) 6.25-10 MG/5ML syrup, Take 5 mL by mouth Every 4 (Four) Hours As Needed for Cough., Disp: 180 mL, Rfl: 0     Objective     History of Present Illness  Bernice has had an left ear ache on and off for the past few weeks. She denies dizziness, drainage, or URI s/s. She has not taken anything otc recently. She has used flonase in the past     Review of Systems   Constitutional: Negative for chills, fatigue and fever.   HENT: Positive for ear pain. Negative for congestion, ear discharge,  sinus pressure, sneezing and sore throat.    Respiratory: Negative.    Cardiovascular: Negative.    Gastrointestinal: Negative.        Physical Exam   Constitutional: Vital signs are normal. She appears well-developed and well-nourished. No distress.   HENT:   Right Ear: Tympanic membrane is bulging.   Left Ear: Tympanic membrane is erythematous and bulging.   Neck: Neck supple.   Cardiovascular: Normal rate and regular rhythm.    Pulmonary/Chest: Effort normal and breath sounds normal.   Neurological: She is alert.   Skin: Skin is warm and dry.       ASSESSMENT     Problem List Items Addressed This Visit     None      Visit Diagnoses     Acute serous otitis media of left ear, recurrence not specified    -  Primary          PLAN    Restart flonase for 30 days  Recommend also adding sudafed otc 30mg 1-2 times daily for 2 weeks  Follow up if symptoms persist, worsen, or if new symptoms develop  She can call me if she needs a refill on on promethazine with codeine and will leave it at the desk for her.

## 2017-11-14 NOTE — PROGRESS NOTES
Subjective   Bernice Anderson is a 36 y.o. female.   Chief Complaint   Patient presents with   • adoption physical form      Vitals:    11/14/17 1347   BP: 106/70   Pulse: 67   Resp: 16     Patient's last menstrual period was 11/13/2017.    History of Present Illness  Bernice is here for an adoption physical. She needs a form completed for the state [facility].     The following portions of the patient's history were reviewed and updated as appropriate: allergies, current medications, past family history, past medical history, past social history, past surgical history and problem list.    Review of Systems   Constitutional: Negative.    HENT: Positive for ear pain.    Respiratory: Negative.    Cardiovascular: Negative.    Gastrointestinal: Negative.    Genitourinary: Negative.    Musculoskeletal: Negative.    Skin: Negative.    Neurological: Negative.    Hematological: Negative.    Psychiatric/Behavioral: Negative.        Objective   Physical Exam   Constitutional: Vital signs are normal. She appears well-developed and well-nourished. No distress.   Cardiovascular: Normal rate.    Pulmonary/Chest: Effort normal.   Neurological: She is alert.   Skin: Skin is warm and dry.   Psychiatric: She has a normal mood and affect.       Assessment/Plan   Bernice was seen today for adoption physical form .    Diagnoses and all orders for this visit:    Pre-adoption interview      Form completed self pay $50

## 2018-01-04 DIAGNOSIS — J45.991 ASTHMA, COUGH VARIANT: ICD-10-CM

## 2018-01-30 DIAGNOSIS — Z00.00 ROUTINE HEALTH MAINTENANCE: Primary | ICD-10-CM

## 2018-02-06 ENCOUNTER — CLINICAL SUPPORT (OUTPATIENT)
Dept: FAMILY MEDICINE CLINIC | Facility: CLINIC | Age: 38
End: 2018-02-06

## 2018-02-06 DIAGNOSIS — Z00.00 ROUTINE HEALTH MAINTENANCE: Primary | ICD-10-CM

## 2018-02-06 LAB
ALBUMIN SERPL-MCNC: 4.3 G/DL (ref 3.5–5.2)
ALBUMIN/GLOB SERPL: 1.7 G/DL
ALP SERPL-CCNC: 39 U/L (ref 39–117)
ALT SERPL-CCNC: 18 U/L (ref 1–33)
APPEARANCE UR: CLEAR
AST SERPL-CCNC: 18 U/L (ref 1–32)
BILIRUB SERPL-MCNC: 0.5 MG/DL (ref 0.1–1.2)
BILIRUB UR QL STRIP: NEGATIVE
BUN SERPL-MCNC: 10 MG/DL (ref 6–20)
BUN/CREAT SERPL: 13.7 (ref 7–25)
CALCIUM SERPL-MCNC: 9.6 MG/DL (ref 8.6–10.5)
CHLORIDE SERPL-SCNC: 99 MMOL/L (ref 98–107)
CHOLEST SERPL-MCNC: 200 MG/DL (ref 0–200)
CO2 SERPL-SCNC: 27.8 MMOL/L (ref 22–29)
COLOR UR: YELLOW
CREAT SERPL-MCNC: 0.73 MG/DL (ref 0.57–1)
GFR SERPLBLD CREATININE-BSD FMLA CKD-EPI: 110 ML/MIN/1.73
GFR SERPLBLD CREATININE-BSD FMLA CKD-EPI: 91 ML/MIN/1.73
GLOBULIN SER CALC-MCNC: 2.5 GM/DL
GLUCOSE SERPL-MCNC: 86 MG/DL (ref 65–99)
GLUCOSE UR QL: NEGATIVE
HDLC SERPL-MCNC: 91 MG/DL (ref 40–60)
HGB UR QL STRIP: NEGATIVE
KETONES UR QL STRIP: NEGATIVE
LDLC SERPL CALC-MCNC: 93 MG/DL (ref 0–100)
LDLC/HDLC SERPL: 1.02 {RATIO}
LEUKOCYTE ESTERASE UR QL STRIP: NEGATIVE
NITRITE UR QL STRIP: NEGATIVE
PH UR STRIP: 6 [PH] (ref 5–8)
POTASSIUM SERPL-SCNC: 4.5 MMOL/L (ref 3.5–5.2)
PROT SERPL-MCNC: 6.8 G/DL (ref 6–8.5)
PROT UR QL STRIP: NEGATIVE
SODIUM SERPL-SCNC: 137 MMOL/L (ref 136–145)
SP GR UR: 1.01 (ref 1–1.03)
TRIGL SERPL-MCNC: 80 MG/DL (ref 0–150)
UROBILINOGEN UR STRIP-MCNC: NORMAL MG/DL
VLDLC SERPL CALC-MCNC: 16 MG/DL (ref 5–40)

## 2018-02-06 PROCEDURE — 93000 ELECTROCARDIOGRAM COMPLETE: CPT | Performed by: INTERNAL MEDICINE

## 2018-02-06 PROCEDURE — 85025 COMPLETE CBC W/AUTO DIFF WBC: CPT | Performed by: INTERNAL MEDICINE

## 2018-02-22 ENCOUNTER — OFFICE VISIT (OUTPATIENT)
Dept: FAMILY MEDICINE CLINIC | Facility: CLINIC | Age: 38
End: 2018-02-22

## 2018-02-22 VITALS
OXYGEN SATURATION: 100 % | WEIGHT: 127 LBS | DIASTOLIC BLOOD PRESSURE: 60 MMHG | HEART RATE: 72 BPM | HEIGHT: 64 IN | RESPIRATION RATE: 15 BRPM | SYSTOLIC BLOOD PRESSURE: 90 MMHG | BODY MASS INDEX: 21.68 KG/M2

## 2018-02-22 DIAGNOSIS — J45.991 ASTHMA, COUGH VARIANT: ICD-10-CM

## 2018-02-22 DIAGNOSIS — Z00.00 WELL ADULT EXAM: Primary | ICD-10-CM

## 2018-02-22 PROCEDURE — 99395 PREV VISIT EST AGE 18-39: CPT | Performed by: INTERNAL MEDICINE

## 2018-02-22 RX ORDER — ALBUTEROL SULFATE 90 UG/1
1 AEROSOL, METERED RESPIRATORY (INHALATION) EVERY 6 HOURS PRN
Qty: 8.5 INHALER | Refills: 6 | Status: SHIPPED | OUTPATIENT
Start: 2018-02-22 | End: 2019-12-26 | Stop reason: SDUPTHER

## 2018-02-22 NOTE — PROGRESS NOTES
Subjective   Bernice BARRERA is a 37 y.o. female. Patient is here today for   Chief Complaint   Patient presents with   • Annual Exam          Vitals:    02/22/18 1037   BP: 90/60   Pulse: 72   Resp: 15   SpO2: 100%       The following portions of the patient's history were reviewed and updated as appropriate: allergies, current medications, past family history, past medical history, past social history, past surgical history and problem list.    Past Medical History:   Diagnosis Date   • Acne    • Amenorrhea       No Known Allergies   Social History     Social History   • Marital status:      Spouse name: N/A   • Number of children: N/A   • Years of education: N/A     Occupational History   • Not on file.     Social History Main Topics   • Smoking status: Never Smoker   • Smokeless tobacco: Never Used   • Alcohol use Yes   • Drug use: No   • Sexual activity: Defer     Other Topics Concern   • Not on file     Social History Narrative        Current Outpatient Prescriptions:   •  ADVAIR DISKUS 250-50 MCG/DOSE DISKUS, INHALE 1 PUFF 2 (TWO) TIMES A DAY-[facility] [id number], Disp: 180 each, Rfl: 0  •  Prenatal Vit-DSS-Fe Cbn-FA (PRENATAL AD) tablet, Take by mouth., Disp: , Rfl:   •  PROAIR  (90 BASE) MCG/ACT inhaler, INHALE 1 TO 2 PUFFS EVERY 4 TO 6 HOURS AS NEEDED., Disp: 8.5 inhaler, Rfl: 0     EKG  ECG Report  Electrocardiogram showed normal sinus rhythm with regular rate.    PA and lateral chest x-ray were not indicated.  Objective   HPI Comments: This patient is here for a comprehensive physical exam.  She has a gynecologist with whom she follows up for regular pelvic Pap breast exams etc.    She remains physically active she exercises aerobically for an hour a day 3 days week.     She feels that she has good control of her asthma on Advair twice daily and albuterol when necessary (rarely).      She has no complaints.     Bernice BARRERA 37 y.o. female who presents for an Annual Wellness Visit.  she has a  history of   Patient Active Problem List   Diagnosis   • Allergic rhinitis   • Asthma, cough variant   • Well adult exam   • Pain and swelling of knee   • Internal derangement of knee joint   • Chondromalacia patellae of left knee   • Current tear of meniscus of knee   .  she has been doing well with new interval problems.  Labs results discussed in detail with the patient.  Plan to update vaccines if needed today.      Lab Results (most recent)     None            Review of Systems   Constitutional: Negative.    HENT: Negative.    Eyes: Negative.    Respiratory: Negative.    Cardiovascular: Negative.    Gastrointestinal: Negative.    Endocrine: Negative.    Genitourinary: Negative.    Musculoskeletal: Negative.    Skin: Negative.    Allergic/Immunologic: Negative.    Neurological: Negative.    Hematological: Negative.    Psychiatric/Behavioral: Negative.        Physical Exam   Constitutional: She is oriented to person, place, and time. She appears well-developed and well-nourished. No distress.       Pleasant, neatly groomed, BMI 21.------------------------------------------------------------------------------   HENT:   Head: Normocephalic and atraumatic.   Right Ear: External ear normal.   Left Ear: External ear normal.   Nose: Nose normal.   Mouth/Throat: Oropharynx is clear and moist. No oropharyngeal exudate.   Eyes: Conjunctivae and EOM are normal. Pupils are equal, round, and reactive to light. Right eye exhibits no discharge. Left eye exhibits no discharge. No scleral icterus.   Neck: Normal range of motion. Neck supple. No JVD present. No tracheal deviation present. No thyromegaly present.   Cardiovascular: Normal rate, regular rhythm, normal heart sounds and intact distal pulses.  Exam reveals no gallop and no friction rub.    No murmur heard.  Pulmonary/Chest: Effort normal and breath sounds normal. No stridor. No respiratory distress. She has no wheezes. She has no rales. She exhibits no tenderness.    Abdominal: Soft. Bowel sounds are normal. She exhibits no distension and no mass. There is no tenderness. There is no rebound and no guarding. No hernia.   Genitourinary:   Genitourinary Comments: Deferred to gynecology   Musculoskeletal: Normal range of motion. She exhibits no edema, tenderness or deformity.   Lymphadenopathy:     She has no cervical adenopathy.   Neurological: She is alert and oriented to person, place, and time. She has normal reflexes. She displays normal reflexes. No cranial nerve deficit. She exhibits normal muscle tone. Coordination normal.   Skin: Skin is warm and dry. No rash noted. She is not diaphoretic. No erythema. No pallor.   Psychiatric: She has a normal mood and affect. Her behavior is normal. Judgment and thought content normal.   Nursing note and vitals reviewed.      ASSESSMENT       Problem List Items Addressed This Visit        Respiratory    Asthma, cough variant       Other    Well adult exam - Primary          PLAN  She enjoys excellent health.    I asked her to follow-up in a year or so to refill on her asthma medications necessary.  Next    I asked her to follow-up in a couple years for a comprehensive physical exam.  There are no Patient Instructions on file for this visit.    No Follow-up on file.

## 2018-04-28 DIAGNOSIS — J45.991 ASTHMA, COUGH VARIANT: ICD-10-CM

## 2018-05-07 DIAGNOSIS — J45.991 ASTHMA, COUGH VARIANT: ICD-10-CM

## 2018-05-15 DIAGNOSIS — J45.991 ASTHMA, COUGH VARIANT: ICD-10-CM

## 2018-05-17 DIAGNOSIS — J45.991 ASTHMA, COUGH VARIANT: ICD-10-CM

## 2018-05-21 DIAGNOSIS — J45.991 ASTHMA, COUGH VARIANT: ICD-10-CM

## 2018-06-05 DIAGNOSIS — J45.991 ASTHMA, COUGH VARIANT: ICD-10-CM

## 2018-06-11 DIAGNOSIS — J45.991 ASTHMA, COUGH VARIANT: ICD-10-CM

## 2018-09-05 DIAGNOSIS — J45.991 ASTHMA, COUGH VARIANT: ICD-10-CM

## 2018-09-18 DIAGNOSIS — J45.991 ASTHMA, COUGH VARIANT: ICD-10-CM

## 2018-09-20 DIAGNOSIS — J45.991 ASTHMA, COUGH VARIANT: ICD-10-CM

## 2018-10-31 ENCOUNTER — FLU SHOT (OUTPATIENT)
Dept: FAMILY MEDICINE CLINIC | Facility: CLINIC | Age: 38
End: 2018-10-31

## 2018-10-31 DIAGNOSIS — Z23 NEED FOR VACCINATION: Primary | ICD-10-CM

## 2018-10-31 PROCEDURE — 90674 CCIIV4 VAC NO PRSV 0.5 ML IM: CPT | Performed by: INTERNAL MEDICINE

## 2018-10-31 PROCEDURE — 90471 IMMUNIZATION ADMIN: CPT | Performed by: INTERNAL MEDICINE

## 2018-11-21 ENCOUNTER — OFFICE VISIT (OUTPATIENT)
Dept: FAMILY MEDICINE CLINIC | Facility: CLINIC | Age: 38
End: 2018-11-21

## 2018-11-21 VITALS
RESPIRATION RATE: 16 BRPM | HEART RATE: 59 BPM | TEMPERATURE: 97.8 F | SYSTOLIC BLOOD PRESSURE: 102 MMHG | OXYGEN SATURATION: 98 % | BODY MASS INDEX: 21.75 KG/M2 | DIASTOLIC BLOOD PRESSURE: 78 MMHG | WEIGHT: 127.4 LBS | HEIGHT: 64 IN

## 2018-11-21 DIAGNOSIS — J20.9 ACUTE BRONCHITIS, UNSPECIFIED ORGANISM: Primary | ICD-10-CM

## 2018-11-21 PROCEDURE — 99213 OFFICE O/P EST LOW 20 MIN: CPT | Performed by: INTERNAL MEDICINE

## 2018-11-21 RX ORDER — AZITHROMYCIN 250 MG/1
TABLET, FILM COATED ORAL
Qty: 6 TABLET | Refills: 0 | Status: SHIPPED | OUTPATIENT
Start: 2018-11-21 | End: 2019-03-04

## 2018-11-21 RX ORDER — GUAIFENESIN AND CODEINE PHOSPHATE 100; 10 MG/5ML; MG/5ML
5 SOLUTION ORAL 3 TIMES DAILY PRN
Qty: 180 ML | Refills: 0 | Status: SHIPPED | OUTPATIENT
Start: 2018-11-21 | End: 2019-03-04

## 2018-11-25 PROBLEM — J20.9 ACUTE BRONCHITIS: Status: ACTIVE | Noted: 2018-11-25

## 2018-11-25 NOTE — PROGRESS NOTES
Subjective   Bernice ZAMAN is a 37 y.o. female. Patient is here today for   Chief Complaint   Patient presents with   • Cough     pt states has been going on for about3 weeks           Vitals:    11/21/18 1327   BP: 102/78   Pulse: 59   Resp: 16   Temp: 97.8 °F (36.6 °C)   SpO2: 98%       Past Medical History:   Diagnosis Date   • Acne    • Amenorrhea       No Known Allergies   Social History     Socioeconomic History   • Marital status:      Spouse name: Not on file   • Number of children: Not on file   • Years of education: Not on file   • Highest education level: Not on file   Social Needs   • Financial resource strain: Not on file   • Food insecurity - worry: Not on file   • Food insecurity - inability: Not on file   • Transportation needs - medical: Not on file   • Transportation needs - non-medical: Not on file   Occupational History   • Not on file   Tobacco Use   • Smoking status: Never Smoker   • Smokeless tobacco: Never Used   Substance and Sexual Activity   • Alcohol use: Yes   • Drug use: No   • Sexual activity: Defer   Other Topics Concern   • Not on file   Social History Narrative   • Not on file        Current Outpatient Medications:   •  albuterol (PROAIR HFA) 108 (90 Base) MCG/ACT inhaler, Inhale 1 puff Every 6 (Six) Hours As Needed for Wheezing., Disp: 8.5 inhaler, Rfl: 6  •  fluticasone-salmeterol (ADVAIR DISKUS) 250-50 MCG/DOSE DISKUS, Inhale 1 puff 2 (Two) Times a Day., Disp: 180 each, Rfl: 3  •  Prenatal Vit-DSS-Fe Cbn-FA (PRENATAL AD) tablet, Take by mouth., Disp: , Rfl:   •  azithromycin (ZITHROMAX) 250 MG tablet, Take 2 tablets the first day, then 1 tablet daily for 4 days., Disp: 6 tablet, Rfl: 0  •  guaifenesin-codeine (GUAIFENESIN AC) 100-10 MG/5ML liquid, Take 5 mL by mouth 3 (Three) Times a Day As Needed for Cough., Disp: 180 mL, Rfl: 0     Objective     She's had a cough which is been productive for the last 3 weeks.    She denies any dyspnea, fevers, chills.         Review  of Systems   Constitutional: Negative.  Negative for chills and fever.   HENT: Negative.    Respiratory: Positive for cough. Negative for shortness of breath.        Physical Exam   Constitutional: She is oriented to person, place, and time. She appears well-developed and well-nourished.   Pleasant, neatly groomed, BMI 21.   HENT:   Head: Normocephalic and atraumatic.   Cardiovascular: Normal rate and regular rhythm.   Pulmonary/Chest:   She has some coarse rhonchi bilaterally which clear with coughing.   Neurological: She is alert and oriented to person, place, and time.   Psychiatric: She has a normal mood and affect. Her behavior is normal.   Nursing note and vitals reviewed.        Problem List Items Addressed This Visit        Respiratory    Acute bronchitis - Primary    Relevant Medications    guaifenesin-codeine (GUAIFENESIN AC) 100-10 MG/5ML liquid            PLAN  She has an acute bronchitis which is probably bacterial.  Senna prescription out for Zithromax for her.  Next    I gave her prescription for guaifenesin with codeine cough syrup to use when necessary.    She will it me know if she fails to improve.  No Follow-up on file.

## 2019-03-04 ENCOUNTER — OFFICE VISIT (OUTPATIENT)
Dept: FAMILY MEDICINE CLINIC | Facility: CLINIC | Age: 39
End: 2019-03-04

## 2019-03-04 VITALS
SYSTOLIC BLOOD PRESSURE: 122 MMHG | HEART RATE: 50 BPM | HEIGHT: 64 IN | DIASTOLIC BLOOD PRESSURE: 64 MMHG | RESPIRATION RATE: 16 BRPM | WEIGHT: 135 LBS | BODY MASS INDEX: 23.05 KG/M2 | OXYGEN SATURATION: 98 % | TEMPERATURE: 98.2 F

## 2019-03-04 DIAGNOSIS — J20.9 ACUTE BRONCHITIS, UNSPECIFIED ORGANISM: ICD-10-CM

## 2019-03-04 DIAGNOSIS — J06.9 UPPER RESPIRATORY TRACT INFECTION, UNSPECIFIED TYPE: Primary | ICD-10-CM

## 2019-03-04 PROCEDURE — 99213 OFFICE O/P EST LOW 20 MIN: CPT | Performed by: INTERNAL MEDICINE

## 2019-03-04 RX ORDER — AZITHROMYCIN 250 MG/1
TABLET, FILM COATED ORAL
Qty: 6 TABLET | Refills: 0 | Status: SHIPPED | OUTPATIENT
Start: 2019-03-04 | End: 2019-12-26

## 2019-03-04 NOTE — PROGRESS NOTES
Subjective   Bernice ZAMAN is a 38 y.o. female. Patient is here today for   Chief Complaint   Patient presents with   • Cough     10 days           Vitals:    03/04/19 1100   BP: 122/64   Pulse: 50   Resp: 16   Temp: 98.2 °F (36.8 °C)   SpO2: 98%     The following portions of the patient's history were reviewed and updated as appropriate: allergies, current medications, past family history, past medical history, past social history, past surgical history and problem list.    Past Medical History:   Diagnosis Date   • Acne    • Amenorrhea       No Known Allergies   Social History     Socioeconomic History   • Marital status:      Spouse name: Not on file   • Number of children: Not on file   • Years of education: Not on file   • Highest education level: Not on file   Social Needs   • Financial resource strain: Not on file   • Food insecurity - worry: Not on file   • Food insecurity - inability: Not on file   • Transportation needs - medical: Not on file   • Transportation needs - non-medical: Not on file   Occupational History   • Not on file   Tobacco Use   • Smoking status: Never Smoker   • Smokeless tobacco: Never Used   Substance and Sexual Activity   • Alcohol use: Yes   • Drug use: No   • Sexual activity: Defer   Other Topics Concern   • Not on file   Social History Narrative   • Not on file        Current Outpatient Medications:   •  albuterol (PROAIR HFA) 108 (90 Base) MCG/ACT inhaler, Inhale 1 puff Every 6 (Six) Hours As Needed for Wheezing., Disp: 8.5 inhaler, Rfl: 6  •  fluticasone-salmeterol (ADVAIR DISKUS) 250-50 MCG/DOSE DISKUS, Inhale 1 puff 2 (Two) Times a Day., Disp: 180 each, Rfl: 3  •  azithromycin (ZITHROMAX) 250 MG tablet, Take 2 tablets the first day, then 1 tablet daily for 4 days., Disp: 6 tablet, Rfl: 0     Objective     History of Present Illness Bernice complains of a productive cough started about 10 days ago.  She denies fever, headache, wheezing, or shortness of breath.  She  does have some nasal congestion has been using nasal rinses.  She has asthma and is on Advair.    Review of Systems   Constitutional: Negative for fatigue and fever.   HENT: Negative for congestion.    Respiratory: Positive for cough. Negative for shortness of breath and wheezing.        Physical Exam   Constitutional: She appears well-developed and well-nourished.   HENT:   Nose: Nose normal.   Mouth/Throat: Posterior oropharyngeal erythema present.   Pulmonary/Chest: Effort normal. She has no wheezes. She has no rales.   Lymphadenopathy:     She has no cervical adenopathy.   Psychiatric: She has a normal mood and affect. Her behavior is normal. Judgment and thought content normal.   Vitals reviewed.      ASSESSMENT     Problem List Items Addressed This Visit        Respiratory    Acute bronchitis    URI (upper respiratory infection) - Primary    Relevant Medications    azithromycin (ZITHROMAX) 250 MG tablet          PLAN  Patient Instructions   Drink plenty of fluids and take azithromycin as instructed.    No Follow-up on file.

## 2019-03-25 DIAGNOSIS — J45.991 ASTHMA, COUGH VARIANT: ICD-10-CM

## 2019-09-10 ENCOUNTER — CLINICAL SUPPORT (OUTPATIENT)
Dept: FAMILY MEDICINE CLINIC | Facility: CLINIC | Age: 39
End: 2019-09-10

## 2019-09-10 DIAGNOSIS — Z23 NEED FOR VACCINATION: Primary | ICD-10-CM

## 2019-09-10 PROCEDURE — 90715 TDAP VACCINE 7 YRS/> IM: CPT | Performed by: INTERNAL MEDICINE

## 2019-09-10 PROCEDURE — 90471 IMMUNIZATION ADMIN: CPT | Performed by: INTERNAL MEDICINE

## 2019-10-14 ENCOUNTER — FLU SHOT (OUTPATIENT)
Dept: FAMILY MEDICINE CLINIC | Facility: CLINIC | Age: 39
End: 2019-10-14

## 2019-10-14 DIAGNOSIS — Z23 FLU VACCINE NEED: ICD-10-CM

## 2019-10-14 PROCEDURE — 90471 IMMUNIZATION ADMIN: CPT | Performed by: INTERNAL MEDICINE

## 2019-10-14 PROCEDURE — 90674 CCIIV4 VAC NO PRSV 0.5 ML IM: CPT | Performed by: INTERNAL MEDICINE

## 2019-10-16 ENCOUNTER — TELEPHONE (OUTPATIENT)
Dept: FAMILY MEDICINE CLINIC | Facility: CLINIC | Age: 39
End: 2019-10-16

## 2019-10-16 NOTE — TELEPHONE ENCOUNTER
Pt called and said that her Daughter was diagnosed with Strep Throat yesterday. Bernice herself has a scratchy throat today. She states that this is the way her daughters illness began and she is requesting an Rx to be sent for incase this progresses into a sore throat and possible strep. Please call and let pt know [phone]

## 2019-11-18 ENCOUNTER — OFFICE VISIT (OUTPATIENT)
Dept: FAMILY MEDICINE CLINIC | Facility: CLINIC | Age: 39
End: 2019-11-18

## 2019-11-18 VITALS
WEIGHT: 128 LBS | SYSTOLIC BLOOD PRESSURE: 100 MMHG | HEIGHT: 64 IN | HEART RATE: 70 BPM | BODY MASS INDEX: 21.85 KG/M2 | OXYGEN SATURATION: 98 % | DIASTOLIC BLOOD PRESSURE: 66 MMHG | TEMPERATURE: 98.3 F

## 2019-11-18 DIAGNOSIS — H65.111 ACUTE MUCOID OTITIS MEDIA OF RIGHT EAR: Primary | ICD-10-CM

## 2019-11-18 DIAGNOSIS — R05.9 COUGH: ICD-10-CM

## 2019-11-18 DIAGNOSIS — H65.02 ACUTE SEROUS OTITIS MEDIA OF LEFT EAR, RECURRENCE NOT SPECIFIED: ICD-10-CM

## 2019-11-18 PROCEDURE — 99213 OFFICE O/P EST LOW 20 MIN: CPT | Performed by: NURSE PRACTITIONER

## 2019-11-18 RX ORDER — AZELASTINE 1 MG/ML
1 SPRAY, METERED NASAL 2 TIMES DAILY
Qty: 1 EACH | Refills: 0 | Status: SHIPPED | OUTPATIENT
Start: 2019-11-18 | End: 2019-12-02

## 2019-11-18 RX ORDER — BROMPHENIRAMINE MALEATE, PSEUDOEPHEDRINE HYDROCHLORIDE, AND DEXTROMETHORPHAN HYDROBROMIDE 2; 30; 10 MG/5ML; MG/5ML; MG/5ML
SYRUP ORAL
Qty: 300 ML | Refills: 0 | Status: SHIPPED | OUTPATIENT
Start: 2019-11-18 | End: 2019-12-26

## 2019-11-18 RX ORDER — AMOXICILLIN 875 MG/1
875 TABLET, COATED ORAL 2 TIMES DAILY
Qty: 20 TABLET | Refills: 0 | Status: SHIPPED | OUTPATIENT
Start: 2019-11-18 | End: 2019-11-28

## 2019-11-18 NOTE — PROGRESS NOTES
"Subjective     Bernice ZAMAN is a 38 y.o.. female.     URI    This is a new problem. Episode onset: 1 week. The problem has been unchanged. There has been no fever. Associated symptoms include congestion, coughing (productive), ear pain, headaches, rhinorrhea and a sore throat. Pertinent negatives include no diarrhea, nausea or vomiting. Treatments tried: alkaseltzer plus.   Bronchitis   Associated symptoms include congestion, coughing (productive), headaches and a sore throat. Pertinent negatives include no fever, nausea or vomiting.   Sinusitis   Associated symptoms include congestion, coughing (productive), ear pain, headaches and a sore throat.       The following portions of the patient's history were reviewed and updated as appropriate: allergies, current medications, past family history, past medical history, past social history, past surgical history and problem list.    Past Medical History:   Diagnosis Date   • Acne    • Amenorrhea        Past Surgical History:   Procedure Laterality Date   • COLONOSCOPY     • HEMORRHOIDECTOMY         Review of Systems   Constitutional: Negative for fever.   HENT: Positive for congestion, ear pain, rhinorrhea and sore throat.    Respiratory: Positive for cough (productive).    Gastrointestinal: Negative for diarrhea, nausea and vomiting.   Neurological: Positive for headaches.       No Known Allergies    Objective     Vitals:    11/18/19 1404   BP: 100/66   Pulse: 70   Temp: 98.3 °F (36.8 °C)   SpO2: 98%   Weight: 58.1 kg (128 lb)   Height: 162.6 cm (64.02\")     Body mass index is 21.96 kg/m².    Physical Exam   Constitutional: She is oriented to person, place, and time. She appears well-developed and well-nourished.   HENT:   Head: Normocephalic and atraumatic.   Right Ear: Tympanic membrane is erythematous. A middle ear effusion (yellow, hazy) is present.   Left Ear: Tympanic membrane is not erythematous. A middle ear effusion (clear) is present.   Nose: Nose normal. " Right sinus exhibits no maxillary sinus tenderness and no frontal sinus tenderness. Left sinus exhibits no maxillary sinus tenderness and no frontal sinus tenderness.   Mouth/Throat: Oropharynx is clear and moist.   Eyes: Conjunctivae are normal. Pupils are equal, round, and reactive to light.   Cardiovascular: Normal rate and regular rhythm.   No murmur heard.  Pulmonary/Chest: Effort normal. She has no wheezes. She has no rhonchi. She has no rales.   Musculoskeletal: Normal range of motion.   Lymphadenopathy:     She has no cervical adenopathy.   Neurological: She is alert and oriented to person, place, and time.   Skin: Skin is warm and dry.   Vitals reviewed.        Current Outpatient Medications:   •  albuterol (PROAIR HFA) 108 (90 Base) MCG/ACT inhaler, Inhale 1 puff Every 6 (Six) Hours As Needed for Wheezing., Disp: 8.5 inhaler, Rfl: 6  •  fluticasone-salmeterol (ADVAIR DISKUS) 250-50 MCG/DOSE DISKUS, Inhale 1 puff 2 (Two) Times a Day., Disp: 180 each, Rfl: 3  •  amoxicillin (AMOXIL) 875 MG tablet, Take 1 tablet by mouth 2 (Two) Times a Day for 10 days., Disp: 20 tablet, Rfl: 0  •  azelastine (ASTELIN) 0.1 % nasal spray, 1 spray into the nostril(s) as directed by provider 2 (Two) Times a Day for 14 days. Use in each nostril as directed, Disp: 1 each, Rfl: 0  •  azithromycin (ZITHROMAX) 250 MG tablet, Take 2 tablets the first day, then 1 tablet daily for 4 days., Disp: 6 tablet, Rfl: 0  •  brompheniramine-pseudoephedrine-DM 30-2-10 MG/5ML syrup, 10 ml po three times a day for 10 days, Disp: 300 mL, Rfl: 0      Assessment/Plan   Bernice was seen today for uri, bronchitis and sinusitis.    Diagnoses and all orders for this visit:    Acute mucoid otitis media of right ear  -     amoxicillin (AMOXIL) 875 MG tablet; Take 1 tablet by mouth 2 (Two) Times a Day for 10 days.    Acute serous otitis media of left ear, recurrence not specified  -     azelastine (ASTELIN) 0.1 % nasal spray; 1 spray into the nostril(s) as  directed by provider 2 (Two) Times a Day for 14 days. Use in each nostril as directed    Cough  -     brompheniramine-pseudoephedrine-DM 30-2-10 MG/5ML syrup; 10 ml po three times a day for 10 days        Patient Instructions   Drink plenty of fluids, rest      Return if symptoms worsen or fail to improve, for follow up as needed/as recommended.

## 2019-12-26 ENCOUNTER — OFFICE VISIT (OUTPATIENT)
Dept: FAMILY MEDICINE CLINIC | Facility: CLINIC | Age: 39
End: 2019-12-26

## 2019-12-26 VITALS
HEIGHT: 64 IN | BODY MASS INDEX: 22.36 KG/M2 | WEIGHT: 131 LBS | DIASTOLIC BLOOD PRESSURE: 70 MMHG | OXYGEN SATURATION: 99 % | RESPIRATION RATE: 15 BRPM | HEART RATE: 74 BPM | SYSTOLIC BLOOD PRESSURE: 100 MMHG | TEMPERATURE: 99.6 F

## 2019-12-26 DIAGNOSIS — J06.9 ACUTE URI: Primary | ICD-10-CM

## 2019-12-26 DIAGNOSIS — H65.03 BILATERAL ACUTE SEROUS OTITIS MEDIA, RECURRENCE NOT SPECIFIED: ICD-10-CM

## 2019-12-26 DIAGNOSIS — J45.991 ASTHMA, COUGH VARIANT: ICD-10-CM

## 2019-12-26 PROCEDURE — 99213 OFFICE O/P EST LOW 20 MIN: CPT | Performed by: NURSE PRACTITIONER

## 2019-12-26 RX ORDER — BROMPHENIRAMINE MALEATE, PSEUDOEPHEDRINE HYDROCHLORIDE, AND DEXTROMETHORPHAN HYDROBROMIDE 2; 30; 10 MG/5ML; MG/5ML; MG/5ML
SYRUP ORAL
Qty: 300 ML | Refills: 0 | Status: SHIPPED | OUTPATIENT
Start: 2019-12-26 | End: 2021-09-15

## 2019-12-26 RX ORDER — ALBUTEROL SULFATE 90 UG/1
1 AEROSOL, METERED RESPIRATORY (INHALATION) EVERY 6 HOURS PRN
Qty: 1 INHALER | Refills: 2 | Status: SHIPPED | OUTPATIENT
Start: 2019-12-26 | End: 2020-07-16

## 2019-12-26 NOTE — PROGRESS NOTES
"Physical Therapy Initial Evaluation    Patient Name: Bernice Anderson       Patient MRN: [id number]  : 1980  Physician:Norberto Hwang MD  Date: 3/31/2017    Encounter Diagnoses   Name Primary?   • Other tear of medial meniscus of left knee as current injury, subsequent encounter Yes   • Chronic pain of left knee        Subjective Evaluation    History of Present Illness  Date of onset: 2017  Mechanism of injury: Was doing kickboxing and spin, then that eveing behind the left knee it felt like there was a \"ball\". Got worse with side shuffling. Dr. Hwang thought there was a slight meniscus tear, but was hard to see definitively. Since the left knee started hurting, the right knee has started to hurt. Recently went back to exercise-elliptical. MD instructed pt to avoid sharp movements and kick boxing. RTMD in 6 weeks    Quality of life: excellent    Pain  Current pain ratin  At worst pain ratin  Location: Front part of the knee-sharp at superior patella, and patellar tendon at joint line  Quality: sharp (Feels like the knees need to pop when sits for a long time)  Alleviating factors: Brace, ice. Advil as needed.  Exacerbated by: Sitting crosslegged, cant sit on the legs, squatting, lunges, kneeling on the knees to give daughter a bath, going down the stairs, leg lifts to the side.    Social Support  Lives in: multiple-level home  Lives with: spouse and young children    Diagnostic Tests  MRI studies: abnormal (Possible posterior meniscus tear)    Treatments  No previous or current treatments  Patient Goals  Patient goals for therapy: decreased pain  Patient goal: 2 weeks. Pt will:  1. Demonstrate improve left knee AROM of 0-145  2. Report pain of </= 4/10 with all ADLs  3. Perform squatting and lunge with normal technique and no increased pain   4 weeks. Pt will:  1. Demonstrate improved left hip abduction/flexion MMT of >/= 5/5  2. Report pain of </= 1/10 with all ADLs  3. LEFS >/= 67/80  4. " Per patient is at the [facility], [place], [place] states she is there for 6 mnth follow up. Was not told prior to today that she would need a referral for services. Please forward referral to facility. Patient does not have a fax number states she was told we have it.    Return to spinning with no increased pain           Objective     Observations     Additional Observation Details  Wearing knee brace on left    Tenderness   Left Knee   Tenderness in the medial joint line, medial patella and pes anserinus.     Neurological Testing   Sensation     Knee   Left Knee   Intact: light touch    Right Knee   Intact: light touch     Reflexes   Left   Patellar (L4): normal (2+)    Right   Patellar (L4): normal (2+)    Active Range of Motion   Left Hip   External rotation (90/90): 33 degrees   Internal rotation (90/90): 43 degrees     Right Hip   External rotation (90/90): 35 degrees   Internal rotation (90/90): 40 degrees   Left Knee   Flexion: 140 degrees with pain  Extension: 0 degrees     Right Knee   Flexion: 147 degrees   Extension: 0 degrees     Patellar Mobility   Left Knee Patellar tendons within functional limits include the medial.     Right Knee Patellar tendons within functional limits include the medial.     Strength/Myotome Testing     Left Hip   Planes of Motion   Flexion: 5  Extension: 4+  Abduction: 4+  External rotation: 4+  Internal rotation: 5    Isolated Muscles   Gluteus elvira: 4 (MEDIUS)    Right Hip   Planes of Motion   Flexion: 5  Extension: 5  Abduction: 5  External rotation: 5  Internal rotation: 5    Isolated Muscles   Gluteus maximums: 4+ (MEDIUS)    Left Knee   Flexion: 4+  Extension: 4+    Right Knee   Flexion: 4+  Extension: 4+    Tests     Left Knee   Negative anterior drawer, posterior drawer, valgus stress test at 0 degrees, valgus stress test at 30 degrees and varus stress test at 0 degrees.     Additional Tests Details  Held on aggressive meniscal testing due to MRI imaging      Assessment & Plan     Assessment  Impairments: abnormal gait, abnormal or restricted ROM, impaired physical strength, lacks appropriate home exercise program and pain with function  Assessment details: Pt will benefit from skilled PT services in order to address listed  impairments and increase tolerance to normal daily activities including ADL's, work and recreational activities.    Prognosis: good    Goals  2 weeks. Pt will:  1. Demonstrate improve left knee AROM of 0-145  2. Report pain of </= 4/10 with all ADLs  3. Perform squatting and lunge with normal technique and no increased pain   4 weeks. Pt will:  1. Demonstrate improved left hip abduction/flexion MMT of >/= 5/5  2. Report pain of </= 1/10 with all ADLs  3. LEFS >/= 67/80  4. Return to spinning with no increased pain       Plan  Therapy options: will be seen for skilled physical therapy services  Planned modality interventions: cryotherapy, thermotherapy (hydrocollator packs), ultrasound and electrical stimulation/Russian stimulation  Planned therapy interventions: manual therapy, neuromuscular re-education, soft tissue mobilization, strengthening, stretching, joint mobilization, home exercise program, gait training, functional ROM exercises, flexibility, body mechanics training, balance/weight-bearing training and abdominal trunk stabilization  Frequency: 2x week  Duration in weeks: 4  Treatment plan discussed with: patient        Therapy Exercise 82129 8 minutes and NMR 45290 6 minutes    Timed Code Treatment: 14   Minutes     Total Treatment Time: 50      Minutes    Fouzia Paez, PT, DPT  Physical Therapist    Initial Certification  Certification Period: 6/29/2017  I certify that the therapy services are furnished while this patient is under my care.  The services outlined above are required by this patient, and will be reviewed every 90 days.     PHYSICIAN: oNrberto Hwang MD      DATE:     Please sign and return via fax to [phone].. Thank you, [facility].

## 2019-12-26 NOTE — PATIENT INSTRUCTIONS
Re-start azestaline nasal spray twice a day for 2 weeks  Start flonase or nasacort twice a day for 2 weeks  Drink plenty of fluids and rest

## 2019-12-26 NOTE — PROGRESS NOTES
"Subjective     Bernice ZAMAN is a 39 y.o.. female.     Earache    There is pain in the left ear. This is a new problem. Episode onset: 2 days. The problem has been unchanged. There has been no fever. Associated symptoms include coughing, rhinorrhea and a sore throat. Pertinent negatives include no diarrhea, headaches or vomiting. Treatments tried: cold/flu caps, nasal spray.       The following portions of the patient's history were reviewed and updated as appropriate: allergies, current medications, past family history, past medical history, past social history, past surgical history and problem list.    Past Medical History:   Diagnosis Date   • Acne    • Amenorrhea        Past Surgical History:   Procedure Laterality Date   • COLONOSCOPY     • HEMORRHOIDECTOMY         Review of Systems   Constitutional: Negative for fever.   HENT: Positive for congestion, ear pain (left), rhinorrhea and sore throat.    Respiratory: Positive for cough.    Gastrointestinal: Negative for diarrhea, nausea and vomiting.   Neurological: Negative for headaches.       No Known Allergies    Objective     Vitals:    12/26/19 1139   BP: 100/70   Pulse: 74   Resp: 15   Temp: 99.6 °F (37.6 °C)   SpO2: 99%   Weight: 59.4 kg (131 lb)   Height: 162.6 cm (64\")     Body mass index is 22.49 kg/m².    Physical Exam   Constitutional: She is oriented to person, place, and time. She appears well-developed and well-nourished.   HENT:   Head: Normocephalic and atraumatic.   Right Ear: Tympanic membrane is not erythematous. A middle ear effusion (clear) is present.   Left Ear: Tympanic membrane is not erythematous. A middle ear effusion (clear) is present.   Nose: Nose normal. Right sinus exhibits no maxillary sinus tenderness and no frontal sinus tenderness. Left sinus exhibits no maxillary sinus tenderness and no frontal sinus tenderness.   Mouth/Throat: Oropharynx is clear and moist. Oropharyngeal exudate: pnd.   Eyes: Pupils are equal, round, and " reactive to light. Conjunctivae are normal.   Cardiovascular: Normal rate and regular rhythm.   No murmur heard.  Pulmonary/Chest: Effort normal. She has no wheezes. She has no rhonchi. She has no rales.   Musculoskeletal: Normal range of motion.   Lymphadenopathy:     She has no cervical adenopathy.   Neurological: She is alert and oriented to person, place, and time.   Skin: Skin is warm and dry.   Vitals reviewed.        Current Outpatient Medications:   •  albuterol sulfate HFA (PROAIR HFA) 108 (90 Base) MCG/ACT inhaler, Inhale 1 puff Every 6 (Six) Hours As Needed for Wheezing or Shortness of Air (coughing episodes)., Disp: 1 inhaler, Rfl: 2  •  brompheniramine-pseudoephedrine-DM 30-2-10 MG/5ML syrup, 10 ml po three times a day for 10 days, Disp: 300 mL, Rfl: 0  •  fluticasone-salmeterol (ADVAIR DISKUS) 250-50 MCG/DOSE DISKUS, Inhale 1 puff 2 (Two) Times a Day., Disp: 180 each, Rfl: 2        Assessment/Plan   Bernice was seen today for cough and earache.    Diagnoses and all orders for this visit:    Acute URI  -     brompheniramine-pseudoephedrine-DM 30-2-10 MG/5ML syrup; 10 ml po three times a day for 10 days    Bilateral acute serous otitis media, recurrence not specified    Asthma, cough variant  -     fluticasone-salmeterol (ADVAIR DISKUS) 250-50 MCG/DOSE DISKUS; Inhale 1 puff 2 (Two) Times a Day.  -     albuterol sulfate HFA (PROAIR HFA) 108 (90 Base) MCG/ACT inhaler; Inhale 1 puff Every 6 (Six) Hours As Needed for Wheezing or Shortness of Air (coughing episodes).        Patient Instructions   Re-start azestaline nasal spray twice a day for 2 weeks  Start flonase or nasacort twice a day for 2 weeks  Drink plenty of fluids and rest      Return if symptoms worsen or fail to improve.

## 2020-07-16 DIAGNOSIS — J45.991 ASTHMA, COUGH VARIANT: ICD-10-CM

## 2020-07-16 RX ORDER — ALBUTEROL SULFATE 90 UG/1
1 AEROSOL, METERED RESPIRATORY (INHALATION) EVERY 6 HOURS PRN
Qty: 8.5 INHALER | Refills: 2 | Status: SHIPPED | OUTPATIENT
Start: 2020-07-16 | End: 2023-03-14 | Stop reason: SDUPTHER

## 2020-10-02 ENCOUNTER — FLU SHOT (OUTPATIENT)
Dept: FAMILY MEDICINE CLINIC | Facility: CLINIC | Age: 40
End: 2020-10-02

## 2020-10-02 DIAGNOSIS — Z23 NEED FOR INFLUENZA VACCINATION: ICD-10-CM

## 2020-10-02 PROCEDURE — 90471 IMMUNIZATION ADMIN: CPT | Performed by: INTERNAL MEDICINE

## 2020-10-02 PROCEDURE — 90686 IIV4 VACC NO PRSV 0.5 ML IM: CPT | Performed by: INTERNAL MEDICINE

## 2021-04-16 ENCOUNTER — BULK ORDERING (OUTPATIENT)
Dept: CASE MANAGEMENT | Facility: OTHER | Age: 41
End: 2021-04-16

## 2021-04-16 DIAGNOSIS — Z23 IMMUNIZATION DUE: ICD-10-CM

## 2021-09-02 DIAGNOSIS — Z00.00 WELL ADULT EXAM: Primary | ICD-10-CM

## 2021-09-03 LAB
ALBUMIN SERPL-MCNC: 4.9 G/DL (ref 3.5–5.2)
ALBUMIN/GLOB SERPL: 2.6 G/DL
ALP SERPL-CCNC: 43 U/L (ref 39–117)
ALT SERPL-CCNC: 22 U/L (ref 1–33)
APPEARANCE UR: CLEAR
AST SERPL-CCNC: 26 U/L (ref 1–32)
BACTERIA #/AREA URNS HPF: NORMAL /HPF
BASOPHILS # BLD AUTO: 0.04 10*3/MM3 (ref 0–0.2)
BASOPHILS NFR BLD AUTO: 0.8 % (ref 0–1.5)
BILIRUB SERPL-MCNC: 0.7 MG/DL (ref 0–1.2)
BILIRUB UR QL STRIP: NEGATIVE
BUN SERPL-MCNC: 12 MG/DL (ref 6–20)
BUN/CREAT SERPL: 14.8 (ref 7–25)
CALCIUM SERPL-MCNC: 9.8 MG/DL (ref 8.6–10.5)
CASTS URNS MICRO: NORMAL
CHLORIDE SERPL-SCNC: 103 MMOL/L (ref 98–107)
CHOLEST SERPL-MCNC: 230 MG/DL (ref 0–200)
CO2 SERPL-SCNC: 27.6 MMOL/L (ref 22–29)
COLOR UR: YELLOW
CREAT SERPL-MCNC: 0.81 MG/DL (ref 0.57–1)
EOSINOPHIL # BLD AUTO: 0.13 10*3/MM3 (ref 0–0.4)
EOSINOPHIL NFR BLD AUTO: 2.7 % (ref 0.3–6.2)
EPI CELLS #/AREA URNS HPF: NORMAL /HPF
ERYTHROCYTE [DISTWIDTH] IN BLOOD BY AUTOMATED COUNT: 12 % (ref 12.3–15.4)
GLOBULIN SER CALC-MCNC: 1.9 GM/DL
GLUCOSE SERPL-MCNC: 81 MG/DL (ref 65–99)
GLUCOSE UR QL: NEGATIVE
HCT VFR BLD AUTO: 42.2 % (ref 34–46.6)
HDLC SERPL-MCNC: 86 MG/DL (ref 40–60)
HGB BLD-MCNC: 14.4 G/DL (ref 12–15.9)
HGB UR QL STRIP: NEGATIVE
IMM GRANULOCYTES # BLD AUTO: 0 10*3/MM3 (ref 0–0.05)
IMM GRANULOCYTES NFR BLD AUTO: 0 % (ref 0–0.5)
KETONES UR QL STRIP: NEGATIVE
LDLC SERPL CALC-MCNC: 128 MG/DL (ref 0–100)
LDLC/HDLC SERPL: 1.46 {RATIO}
LEUKOCYTE ESTERASE UR QL STRIP: ABNORMAL
LYMPHOCYTES # BLD AUTO: 1.08 10*3/MM3 (ref 0.7–3.1)
LYMPHOCYTES NFR BLD AUTO: 22.3 % (ref 19.6–45.3)
MCH RBC QN AUTO: 30.4 PG (ref 26.6–33)
MCHC RBC AUTO-ENTMCNC: 34.1 G/DL (ref 31.5–35.7)
MCV RBC AUTO: 89.2 FL (ref 79–97)
MONOCYTES # BLD AUTO: 0.31 10*3/MM3 (ref 0.1–0.9)
MONOCYTES NFR BLD AUTO: 6.4 % (ref 5–12)
NEUTROPHILS # BLD AUTO: 3.29 10*3/MM3 (ref 1.7–7)
NEUTROPHILS NFR BLD AUTO: 67.8 % (ref 42.7–76)
NITRITE UR QL STRIP: NEGATIVE
NRBC BLD AUTO-RTO: 0 /100 WBC (ref 0–0.2)
PH UR STRIP: 7 [PH] (ref 5–8)
PLATELET # BLD AUTO: 260 10*3/MM3 (ref 140–450)
POTASSIUM SERPL-SCNC: 4.8 MMOL/L (ref 3.5–5.2)
PROT SERPL-MCNC: 6.8 G/DL (ref 6–8.5)
PROT UR QL STRIP: NEGATIVE
RBC # BLD AUTO: 4.73 10*6/MM3 (ref 3.77–5.28)
RBC #/AREA URNS HPF: NORMAL /HPF
SODIUM SERPL-SCNC: 140 MMOL/L (ref 136–145)
SP GR UR: 1.01 (ref 1–1.03)
TRIGL SERPL-MCNC: 94 MG/DL (ref 0–150)
TSH SERPL DL<=0.005 MIU/L-ACNC: 1.94 UIU/ML (ref 0.27–4.2)
UROBILINOGEN UR STRIP-MCNC: ABNORMAL MG/DL
VLDLC SERPL CALC-MCNC: 16 MG/DL (ref 5–40)
WBC # BLD AUTO: 4.85 10*3/MM3 (ref 3.4–10.8)
WBC #/AREA URNS HPF: NORMAL /HPF

## 2021-09-10 ENCOUNTER — OFFICE VISIT (OUTPATIENT)
Dept: FAMILY MEDICINE CLINIC | Facility: CLINIC | Age: 41
End: 2021-09-10

## 2021-09-10 VITALS
DIASTOLIC BLOOD PRESSURE: 62 MMHG | OXYGEN SATURATION: 99 % | HEIGHT: 64 IN | RESPIRATION RATE: 18 BRPM | SYSTOLIC BLOOD PRESSURE: 94 MMHG | TEMPERATURE: 97.1 F | BODY MASS INDEX: 22.02 KG/M2 | WEIGHT: 129 LBS | HEART RATE: 65 BPM

## 2021-09-10 DIAGNOSIS — J45.991 ASTHMA, COUGH VARIANT: ICD-10-CM

## 2021-09-10 DIAGNOSIS — Z00.00 WELL ADULT EXAM: Primary | ICD-10-CM

## 2021-09-10 PROCEDURE — 99395 PREV VISIT EST AGE 18-39: CPT | Performed by: INTERNAL MEDICINE

## 2021-09-15 NOTE — PROGRESS NOTES
Subjective   Bernice ZAMAN is a 40 y.o. female. Patient is here today for   Chief Complaint   Patient presents with   • Annual Exam     PHYSICAL          Vitals:    09/10/21 1056   BP: 94/62   Pulse: 65   Resp: 18   Temp: 97.1 °F (36.2 °C)   SpO2: 99%     Body mass index is 22.14 kg/m².    The following portions of the patient's history were reviewed and updated as appropriate: allergies, current medications, past family history, past medical history, past social history, past surgical history and problem list.    Past Medical History:   Diagnosis Date   • Acne    • Amenorrhea       No Known Allergies   Social History     Socioeconomic History   • Marital status:      Spouse name: Not on file   • Number of children: Not on file   • Years of education: Not on file   • Highest education level: Not on file   Tobacco Use   • Smoking status: Never Smoker   • Smokeless tobacco: Never Used   Substance and Sexual Activity   • Alcohol use: Yes   • Drug use: No   • Sexual activity: Defer        Current Outpatient Medications:   •  ALBUTEROL SULFATE  (90 Base) MCG/ACT inhaler, INHALE 1 PUFF EVERY 6 (SIX) HOURS AS NEEDED FOR WHEEZING OR SHORTNESS OF AIR (COUGHING EPISODES)., Disp: 8.5 inhaler, Rfl: 2     EKG  ECG Report    Objective   This patient is here for a comprehensive physical exam.    Follows up with gynecology routinely for pelvic, Pap, breast exams etc.    He has no complaints.     Bernice ZAMAN 40 y.o. female who presents for an Annual Wellness Visit.  she has a history of   Patient Active Problem List   Diagnosis   • Allergic rhinitis   • Asthma, cough variant   • Well adult exam   • Pain and swelling of knee   • Internal derangement of knee joint   • Chondromalacia patellae of left knee   • Current tear of meniscus of knee   • Acute bronchitis   • URI (upper respiratory infection)   .  she has been doing well with new interval problems.  Labs results discussed in detail with the patient.  Plan  to update vaccines if needed today.      Lab Results (most recent)     None            Review of Systems   Constitutional: Negative.    HENT: Negative.    Eyes: Negative.    Respiratory: Negative.    Cardiovascular: Negative.    Gastrointestinal: Negative.    Endocrine: Negative.    Genitourinary: Negative.    Musculoskeletal: Negative.    Skin: Negative.    Allergic/Immunologic: Negative.    Neurological: Negative.    Hematological: Negative.    Psychiatric/Behavioral: Negative.        Physical Exam  Vitals and nursing note reviewed.   Constitutional:       General: She is not in acute distress.     Appearance: Normal appearance. She is normal weight. She is not ill-appearing, toxic-appearing or diaphoretic.      Comments: Pleasant, neatly groomed, no distress.   HENT:      Head: Normocephalic.      Right Ear: Tympanic membrane, ear canal and external ear normal.      Left Ear: Tympanic membrane, ear canal and external ear normal.      Nose: Nose normal. No congestion or rhinorrhea.      Mouth/Throat:      Mouth: Mucous membranes are dry.      Pharynx: No oropharyngeal exudate or posterior oropharyngeal erythema.   Eyes:      General: No scleral icterus.        Right eye: No discharge.         Left eye: No discharge.      Extraocular Movements: Extraocular movements intact.      Conjunctiva/sclera: Conjunctivae normal.      Pupils: Pupils are equal, round, and reactive to light.   Neck:      Vascular: No carotid bruit.   Cardiovascular:      Rate and Rhythm: Normal rate and regular rhythm.      Pulses: Normal pulses.      Heart sounds: Normal heart sounds. No murmur heard.   No friction rub.   Pulmonary:      Effort: Pulmonary effort is normal. No respiratory distress.      Breath sounds: Normal breath sounds. No stridor. No wheezing, rhonchi or rales.   Chest:      Chest wall: No tenderness.   Abdominal:      General: There is no distension.      Palpations: There is no mass.      Tenderness: There is no abdominal  tenderness. There is no right CVA tenderness, guarding or rebound.      Hernia: No hernia is present.   Genitourinary:     Comments: Deferred to gynecology.  Musculoskeletal:         General: No swelling, tenderness, deformity or signs of injury. Normal range of motion.      Cervical back: Normal range of motion and neck supple. No rigidity or tenderness.      Right lower leg: No edema.      Left lower leg: No edema.   Lymphadenopathy:      Cervical: No cervical adenopathy.   Skin:     General: Skin is warm and dry.      Coloration: Skin is not jaundiced or pale.      Findings: No bruising, erythema, lesion or rash.   Neurological:      General: No focal deficit present.      Mental Status: She is alert and oriented to person, place, and time.      Cranial Nerves: No cranial nerve deficit.      Sensory: No sensory deficit.      Motor: No weakness.      Coordination: Coordination normal.      Gait: Gait normal.      Deep Tendon Reflexes: Reflexes normal.   Psychiatric:         Mood and Affect: Mood normal.         Behavior: Behavior normal.         Thought Content: Thought content normal.         Judgment: Judgment normal.         ASSESSMENT       Problems Addressed this Visit        Health Encounters    Well adult exam - Primary       Pulmonary and Pneumonias    Asthma, cough variant      Diagnoses       Codes Comments    Well adult exam    -  Primary ICD-10-CM: Z00.00  ICD-9-CM: V70.0     Asthma, cough variant     ICD-10-CM: J45.991  ICD-9-CM: 493.82           PLAN  She enjoys excellent health.    She and I reviewed her labs.  All was normal.    She has cough variant asthma which bothers her infrequently.  She occasionally uses albuterol appropriately which treats her symptoms promptly.    I asked her to follow-up in a year and as needed.  There are no Patient Instructions on file for this visit.    No follow-ups on file.

## 2021-11-09 ENCOUNTER — OFFICE VISIT (OUTPATIENT)
Dept: FAMILY MEDICINE CLINIC | Facility: CLINIC | Age: 41
End: 2021-11-09

## 2021-11-09 VITALS
TEMPERATURE: 96.9 F | DIASTOLIC BLOOD PRESSURE: 64 MMHG | OXYGEN SATURATION: 98 % | BODY MASS INDEX: 23.08 KG/M2 | WEIGHT: 135.2 LBS | RESPIRATION RATE: 18 BRPM | HEART RATE: 60 BPM | HEIGHT: 64 IN | SYSTOLIC BLOOD PRESSURE: 98 MMHG

## 2021-11-09 DIAGNOSIS — Z23 NEED FOR IMMUNIZATION AGAINST INFLUENZA: ICD-10-CM

## 2021-11-09 DIAGNOSIS — S46.819A STRAIN OF TRAPEZIUS MUSCLE, UNSPECIFIED LATERALITY, INITIAL ENCOUNTER: ICD-10-CM

## 2021-11-09 DIAGNOSIS — M54.2 CHRONIC NECK PAIN: Primary | ICD-10-CM

## 2021-11-09 DIAGNOSIS — G89.29 CHRONIC NECK PAIN: Primary | ICD-10-CM

## 2021-11-09 PROCEDURE — 90471 IMMUNIZATION ADMIN: CPT | Performed by: NURSE PRACTITIONER

## 2021-11-09 PROCEDURE — 90686 IIV4 VACC NO PRSV 0.5 ML IM: CPT | Performed by: NURSE PRACTITIONER

## 2021-11-09 PROCEDURE — 99213 OFFICE O/P EST LOW 20 MIN: CPT | Performed by: NURSE PRACTITIONER
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
## 2021-11-09 NOTE — PATIENT INSTRUCTIONS
May use cold compress/ice pack 10-15 minutes at a time several times a day   May use warm compress/heating pad 10-15 minutes at at time several times a day  May use over the counter biofreeze as needed (wash off from skin before using heating pad)  Ordering physical therapy referral today.  Copy of MRI scanned into chart

## 2021-12-10 ENCOUNTER — TREATMENT (OUTPATIENT)
Dept: PHYSICAL THERAPY | Facility: CLINIC | Age: 41
End: 2021-12-10

## 2021-12-10 DIAGNOSIS — S46.819A STRAIN OF TRAPEZIUS MUSCLE, UNSPECIFIED LATERALITY, INITIAL ENCOUNTER: Primary | ICD-10-CM

## 2021-12-10 DIAGNOSIS — G89.29 CHRONIC NECK PAIN: ICD-10-CM

## 2021-12-10 DIAGNOSIS — M54.2 CHRONIC NECK PAIN: ICD-10-CM

## 2021-12-10 PROCEDURE — 97161 PT EVAL LOW COMPLEX 20 MIN: CPT | Performed by: PHYSICAL THERAPIST

## 2021-12-10 PROCEDURE — 97530 THERAPEUTIC ACTIVITIES: CPT | Performed by: PHYSICAL THERAPIST

## 2021-12-10 PROCEDURE — 97110 THERAPEUTIC EXERCISES: CPT | Performed by: PHYSICAL THERAPIST

## 2021-12-10 NOTE — PROGRESS NOTES
Physical Therapy Initial Evaluation and Plan of Care        Patient: Bernice ZAMAN   : 1980  Visit Diagnoses: No diagnosis found.  Referring practitioner: NEREYDA Sanderson  Date of Initial Visit: 12/10/2021  Today's Date: 12/10/2021  Patient seen for 1 sessions           Subjective Questionnaire: NDI:      Subjective Evaluation    History of Present Illness  Date of onset: 2021  Mechanism of injury: Patient reports she has had neck pain since .  Has been having chiropractic care-treatments help.  Has HA, pain in arms.  States her symptoms flare up without regular treatments from the chiropractor.      Wears a posture brace which helps-uses it with strenuous tasks and when running.    PMH:  Acne; Amenorrhea, asthma    Subjective comment: Patient reports neck pain and HA.  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           Objective          Palpation   Left   Hypertonic in the levator scapulae, rhomboids and upper trapezius.   Tenderness of the levator scapulae, rhomboids and upper trapezius.   Trigger point  to levator scapulae and upper trapezius.     Tenderness   Cervical Spine   Tenderness in the spinous process.     Active Range of Motion   Cervical/Thoracic Spine   Cervical    Flexion: 50 degrees   Extension: 85 degrees with pain  Left lateral flexion: 50 degrees   Right lateral flexion: 55 degrees   Left rotation: 60 degrees   Right rotation: 70 degrees     Strength/Myotome Testing   Cervical Spine   Neck extension: 4+  Neck flexion: 4-    Left   Neck lateral flexion (C3): 5    Right   Neck lateral flexion (C3): 5    Left Shoulder     Planes of Motion   Abduction: 5   External rotation at 0°: 5     Right Shoulder     Planes of Motion   Abduction: 5   External rotation at 0°: 5     Left Elbow   Flexion: 5  Extension: 5    Right Elbow   Flexion: 5  Extension: 5    Left Wrist/Hand   Wrist extension: 5  Wrist flexion: 5    Right Wrist/Hand   Wrist extension: 5  Wrist flexion: 5    Tests   Cervical   Positive repeated extension.    Left   Negative Spurling's sign.     Additional Tests Details  Cervical spine segmental hypermobility.          Assessment & Plan     Assessment  Impairments: abnormal muscle tone, activity intolerance, impaired physical strength and pain with function  Functional Limitations: uncomfortable because of pain and unable to perform repetitive tasks  Assessment details: Bernice Anderson is a pleasant 41 y.o. female that presents with left cervical/UQ pain with hypertonic/TTP in left UQ muscles and decreased cervical spine muscle strength. She has above normal range of motion in the cervical spine and segmental hypermobility.  Pt will benefit from skilled PT services in order to address listed impairments, decrease pain and restore function.    Prognosis: good  Prognosis details: Patient demonstrates good rehab potential as evidenced by high motivation to participate with PT POC and to return to PLOF.    Goals  Plan Goals: Short Term Goals (4 wks):  1.  Patient will have increased cervical spine  strength to 5/5 to allow for increased spinal stabilization and support.  2.  Patient will have normalized tone in left cervical/UQ muscles.  3.  Patient will demonstrate proper spinal stabilization with functional reaching and lifting tasks.      Long Term Goals (6 wks):  1.  Patient will be independent in performance of HEP for carryover upon discharge from skilled PT services.  2.  Patient will have improved NDI score of 0/50.  3.  Patient will report ability to perform household chores while observing energy conservation and jt protections strategies without increased pain symptoms.    Plan  Therapy options: will be seen for skilled therapy services  Planned modality interventions: cryotherapy, thermotherapy (hydrocollator packs), ultrasound, dry needling and iontophoresis  Planned therapy interventions: manual therapy, soft tissue mobilization, spinal/joint mobilization, strengthening, stretching, flexibility, functional ROM exercises, joint mobilization, home exercise program, neuromuscular re-education, therapeutic activities, postural training, body mechanics training and IADL retraining  Frequency: 2x week  Duration in weeks: 6  Treatment plan discussed with: patient  Plan details: Pt was educated on the importance of their HEP and their current need for continued skilled physical therapy. Patients goals and potential limitations were discussed and pt is in agreement with current plan of care and treatment emphasis.            Timed:  Manual Therapy:         mins  53975;  Therapeutic Exercise:    10     mins  87057;     Neuromuscular Carol Ann:        mins  40109;    Therapeutic Activity:     15     mins  12023;     Gait Training:           mins  36204;     Ultrasound:          mins  62430;  Iontophoresis:          mins  35143;    Dry Needling:          mins  34688;  Dry Needling:          mins  40616;      Untimed:  Electrical Stimulation:         mins  98629 ( );  Mechanical Traction:         mins   18016;   Canalith Repositioning:        mins  76175;    Timed Treatment:   25   mins   Total Treatment:     45   mins      PT SIGNATURE: Echo Hallman PT     License Number: [id number]  Electronically signed by Echo Hallman PT, 12/10/21, 9:38 AM EST      DATE TREATMENT INITIATED: 12/10/2021    Initial Certification  Certification Period: 12/10/2021 thru 3/9/2022  I certify that the therapy services are furnished while this patient is under my care.  The services outlined above are required by this patient, and will be reviewed every 90 days.     PHYSICIAN: Stacie Castaneda, APRN      DATE:     Please sign and return via fax to [phone]. Thank you, [facility].

## 2021-12-14 ENCOUNTER — TREATMENT (OUTPATIENT)
Dept: PHYSICAL THERAPY | Facility: CLINIC | Age: 41
End: 2021-12-14

## 2021-12-14 DIAGNOSIS — M54.2 CHRONIC NECK PAIN: ICD-10-CM

## 2021-12-14 DIAGNOSIS — S46.819A STRAIN OF TRAPEZIUS MUSCLE, UNSPECIFIED LATERALITY, INITIAL ENCOUNTER: Primary | ICD-10-CM

## 2021-12-14 DIAGNOSIS — G89.29 CHRONIC NECK PAIN: ICD-10-CM

## 2021-12-14 PROCEDURE — 97110 THERAPEUTIC EXERCISES: CPT | Performed by: PHYSICAL THERAPIST

## 2021-12-14 PROCEDURE — 97035 APP MDLTY 1+ULTRASOUND EA 15: CPT | Performed by: PHYSICAL THERAPIST

## 2021-12-14 PROCEDURE — 97140 MANUAL THERAPY 1/> REGIONS: CPT | Performed by: PHYSICAL THERAPIST

## 2021-12-14 NOTE — PROGRESS NOTES
Physical Therapy Daily Progress Note      Patient: Bernice ZAMAN   : 1980  Treatment Diagnosis:     ICD-10-CM ICD-9-CM   1. Strain of trapezius muscle, unspecified laterality, initial encounter  S46.819A 840.8   2. Chronic neck pain  M54.2 723.1    G89.29 338.29     Referring practitioner: NEREYDA Sanderson  Date of Initial Visit: Type: THERAPY  Noted: 12/10/2021  Today's Date: 2021  Patient seen for 2 sessions         Bernice ZAMAN reports:     Subjective   Patient reports her neck has been sore after doing the home exercises.  States ice did help.  Rates neck pain at 1/10.    Objective   See Exercise, Manual, and Modality Logs for complete treatment.       Assessment/Plan  Patient presented with TTP and muscle hypertonicity in the upper cervical/sub occipital region.  She responded positively to manual therapy and US.  Progressed HEP with gentle levator muscle stretch.  Progress per Plan of Care           Timed:  Manual Therapy:   15      mins  82819;  Therapeutic Exercise:   10      mins  63276;     Neuromuscular Carol Ann:        mins  16784;    Therapeutic Activity:          mins  52073;     Gait Training:           mins  81750;     Ultrasound:    2/8      mins  07548;    Iontophoresis:          mins  93917;  Dry Needling:          mins  79495;  Dry Needling:          mins  57220;    Untimed:  Electrical Stimulation:         mins  72291 ( );  Mechanical Traction:         mins  16602;   Canalith Repositioning:      mins  10339;    Timed Treatment:   35   mins   Total Treatment:     45   mins    Echo Hallman PT  Physical Therapist

## 2022-01-03 ENCOUNTER — TREATMENT (OUTPATIENT)
Dept: PHYSICAL THERAPY | Facility: CLINIC | Age: 42
End: 2022-01-03

## 2022-01-03 DIAGNOSIS — S46.819A STRAIN OF TRAPEZIUS MUSCLE, UNSPECIFIED LATERALITY, INITIAL ENCOUNTER: Primary | ICD-10-CM

## 2022-01-03 DIAGNOSIS — M54.2 CHRONIC NECK PAIN: ICD-10-CM

## 2022-01-03 DIAGNOSIS — G89.29 CHRONIC NECK PAIN: ICD-10-CM

## 2022-01-03 PROCEDURE — 97035 APP MDLTY 1+ULTRASOUND EA 15: CPT | Performed by: PHYSICAL THERAPIST

## 2022-01-03 PROCEDURE — 97140 MANUAL THERAPY 1/> REGIONS: CPT | Performed by: PHYSICAL THERAPIST

## 2022-01-03 NOTE — PROGRESS NOTES
Physical Therapy Daily Progress Note      Patient: Bernice ZAMAN   : 1980  Treatment Diagnosis:     ICD-10-CM ICD-9-CM   1. Strain of trapezius muscle, unspecified laterality, initial encounter  S46.819A 840.8   2. Chronic neck pain  M54.2 723.1    G89.29 338.29     Referring practitioner: NEREYDA Sanderson  Date of Initial Visit: Type: THERAPY  Noted: 12/10/2021  Today's Date: 1/3/2022  Patient seen for 3 sessions         Bernice ZAMAN reports:     Subjective   Patient reports she had a couple episode of increased pain that limited her activities one after traveling (by plane) and one after doing an exercise program that included planks.  States she has some soreness and tightness today but not very severe.    Objective   See Exercise, Manual, and Modality Logs for complete treatment.       Assessment/Plan  Patient continues to present with restricted upper thoracic segmental mobility and hypertonic/TTP in cervical/UQ muscles.  She responded positively to manual therapy with improved thoracic spine mobility and decreased cervical muscle hypertonicity.  TTP was also improved following treatment.  Progress per Plan of Care           Timed:  Manual Therapy:   24      mins  66196;  Therapeutic Exercise:    5     mins  62115;     Neuromuscular Carol Ann:        mins  73275;    Therapeutic Activity:          mins  11320;     Gait Training:           mins  91747;     Ultrasound:    2/8      mins  34679;    Iontophoresis:          mins  31883;  Dry Needling:          mins  31050;  Dry Needling:          mins  26147;    Untimed:  Electrical Stimulation:         mins  67710 (MC );  Mechanical Traction:         mins  81973;   Canalith Repositioning:      mins  29560;    Timed Treatment:   39   mins   Total Treatment:     39   mins    Echo Hallman, PT  Physical Therapist

## 2022-01-06 ENCOUNTER — TELEPHONE (OUTPATIENT)
Dept: PHYSICAL THERAPY | Facility: CLINIC | Age: 42
End: 2022-01-06

## 2022-01-11 ENCOUNTER — TREATMENT (OUTPATIENT)
Dept: PHYSICAL THERAPY | Facility: CLINIC | Age: 42
End: 2022-01-11

## 2022-01-11 DIAGNOSIS — S46.819A STRAIN OF TRAPEZIUS MUSCLE, UNSPECIFIED LATERALITY, INITIAL ENCOUNTER: Primary | ICD-10-CM

## 2022-01-11 DIAGNOSIS — M54.2 CHRONIC NECK PAIN: ICD-10-CM

## 2022-01-11 DIAGNOSIS — G89.29 CHRONIC NECK PAIN: ICD-10-CM

## 2022-01-11 PROCEDURE — 97110 THERAPEUTIC EXERCISES: CPT | Performed by: PHYSICAL THERAPIST

## 2022-01-11 PROCEDURE — 97530 THERAPEUTIC ACTIVITIES: CPT | Performed by: PHYSICAL THERAPIST

## 2022-01-11 NOTE — PROGRESS NOTES
Physical Therapy Re-Assessment / Re-Certification        Patient: Bernice ZAMAN   : 1980  Visit Diagnoses:     ICD-10-CM ICD-9-CM   1. Strain of trapezius muscle, unspecified laterality, initial encounter  S46.819A 840.8   2. Chronic neck pain  M54.2 723.1    G89.29 338.29     Referring practitioner: NEREYDA Sanderson  Date of Initial Visit: Type: THERAPY  Noted: 12/10/2021  Today's Date: 2022  Patient seen for 4 sessions      Subjective:   Bernice ZAMAN reports:   Subjective Questionnaire: NDI:  Clinical Progress: improved  Home Program Compliance: Yes  Treatment has included: therapeutic exercise, manual therapy, therapeutic activity, ultrasound and cryotherapy    Subjective   Patient reports she has not had pain since last session.  States daily routine tasks do not cause increase pain.  States the lifting limitation is in regards to UE weight training in the gym.      Objective     Palpation   Left   Hypertonic in the levator scapulae, rhomboids and upper trapezius.   Tenderness of the levator scapulae, rhomboids and upper trapezius.   Trigger point to levator scapulae and upper trapezius.      Tenderness   Cervical Spine   Tenderness in the spinous process.      Active Range of Motion   Cervical/Thoracic Spine   Cervical     Flexion: 50 degrees   Extension: 85 degrees with end range discomfort  Left lateral flexion: 60 degrees   Right lateral flexion: 60 degrees   Left rotation: 80 degrees   Right rotation: 80 degrees      Strength/Myotome Testing   Cervical Spine   Neck extension: 5  Neck flexion: 4     Left   Neck lateral flexion (C3): 5     Right   Neck lateral flexion (C3): 5    Left Shoulder      Planes of Motion   Abduction: 5   External rotation at 0°: 5     Right Shoulder      Planes of Motion   Abduction: 5   External rotation at 0°: 5      Left Elbow   Flexion: 5  Extension: 5     Right Elbow   Flexion: 5  Extension: 5     Left Wrist/Hand   Wrist extension: 5  Wrist flexion:  5     Right Wrist/Hand   Wrist extension: 5  Wrist flexion: 5     Tests   Cervical   Negative repeated extension.     Left   Negative Spurling's sign.     Additional Tests Details  Cervical spine segmental hypermobility.          Assessment/Plan  Patient presents with normalized cervical spine/UQ muscle tone, improved symmetry of cervical spine ROM and increased strength.  She still has mild cervical muscle weakness and has difficulty with coordination of postural support during UE force production.  She benefits from cueing for proper exercise form and to prevent compensatory patterns.  She will benefit from continued PT for strength progression and dynamic stabilization.   Progress toward previous goals: Partially Met    Goal Review  Short Term Goals (2 wks):  1.  Patient will have increased cervical spine strength to 5/5 to allow for increased spinal stabilization and support.-progressing  2.  Patient will have normalized tone in left cervical/UQ muscles.-met  3.  Patient will demonstrate proper spinal stabilization with functional reaching and lifting tasks.-progressing      Long Term Goals (4 wks):  1.  Patient will be independent in performance of HEP for carryover upon discharge from skilled PT services.-progressing  2.  Patient will have improved NDI score of 0/50.-progressing  3.  Patient will report ability to perform household chores while observing energy conservation and jt protections strategies without increased pain symptoms.-progressing      Recommendations: Continue as planned  Timeframe: 1 month  Prognosis to achieve goals: good    PT SIGNATURE: Echo Hallman PT     License Number: [id number]  Electronically signed by Echo Hallman PT, 01/11/22, 9:04 AM EST    Certification Period: 1/11/2022 thru 4/10/2022  I certify that the therapy services are furnished while this patient is under my care.  The services outlined above are required by this patient, and will be reviewed every 90  days.    Signature: __________________________________  Stacie Castaneda APRN    Please sign and return via fax to [phone]. Thank you, [facility].      Timed:  Manual Therapy:         mins  13842;  Therapeutic Exercise:    23     mins  54681;     Neuromuscular Carol Ann:        mins  10654;    Therapeutic Activity:     15     mins  94754;     Gait Training:           mins  73120;     Ultrasound:          mins  75354;  Iontophoresis:          mins  35784;    Dry Needling:          mins  93341;  Dry Needling:          mins  53294;    Untimed:  Electrical Stimulation:         mins  95912 ( );  Mechanical Traction:         mins  51297;   Canalith Repositioning:        mins  80906;    Timed Treatment:   38   mins   Total Treatment:     43   mins

## 2022-01-13 ENCOUNTER — TREATMENT (OUTPATIENT)
Dept: PHYSICAL THERAPY | Facility: CLINIC | Age: 42
End: 2022-01-13

## 2022-01-13 DIAGNOSIS — M54.2 CHRONIC NECK PAIN: ICD-10-CM

## 2022-01-13 DIAGNOSIS — S46.819A STRAIN OF TRAPEZIUS MUSCLE, UNSPECIFIED LATERALITY, INITIAL ENCOUNTER: Primary | ICD-10-CM

## 2022-01-13 DIAGNOSIS — G89.29 CHRONIC NECK PAIN: ICD-10-CM

## 2022-01-13 PROCEDURE — 97140 MANUAL THERAPY 1/> REGIONS: CPT | Performed by: PHYSICAL THERAPIST

## 2022-01-13 PROCEDURE — 20561 NDL INSJ W/O NJX 3+ MUSC: CPT | Performed by: PHYSICAL THERAPIST

## 2022-01-13 NOTE — PROGRESS NOTES
Physical Therapy Daily Progress Note      Patient: Bernice ZAMAN   : 1980  Treatment Diagnosis:     ICD-10-CM ICD-9-CM   1. Strain of trapezius muscle, unspecified laterality, initial encounter  S46.819A 840.8   2. Chronic neck pain  M54.2 723.1    G89.29 338.29     Referring practitioner: NEREYDA Sanderson  Date of Initial Visit: Type: THERAPY  Noted: 12/10/2021  Today's Date: 2022  Patient seen for 5 sessions         Bernice ZAMAN reports:     Subjective   Patient reports she did not have any pain after last session.  The next day had tightness and scapular area and HA, pain level 5/10.  Used heat/ice, TENS and massager.  Had aching today in the scapular area, rates at 2/10.      Objective   See Exercise, Manual, and Modality Logs for complete treatment.       Assessment/Plan  Soft tissue was assessed at cervical/UQ. PT noted point tenderness as well as palpable trigger points within the muslce tissue. On this date patient stated that they would like to undergo a dry needling procedure for the soft tissue dysfunction. Patient was educated on the procedure for dry needling and consent waver signed/on file. Patient was informed of the risks, possible adverse effects, along with the benefits of DN. Patient had a positive response to treatment with decreased muscle hypertonicity and TTP.      Progress per Plan of Care           Timed:  Manual Therapy:   15      mins  96632;  Therapeutic Exercise:         mins  70816;     Neuromuscular Carol Ann:        mins  66842;    Therapeutic Activity:          mins  63322;     Gait Training:           mins  48689;     Ultrasound:          mins  29418;    Iontophoresis:          mins  43994;  Dry Needling:          mins  98580;  Dry Needling:    15      mins  14583;    Untimed:  Electrical Stimulation:         mins  45449 ( );  Mechanical Traction:         mins  93223;   Canalith Repositioning:      mins  85037;    Timed Treatment:   30   mins   Total  Treatment:     40   mins    Echo Hallman, PT  Physical Therapist

## 2022-01-18 ENCOUNTER — TREATMENT (OUTPATIENT)
Dept: PHYSICAL THERAPY | Facility: CLINIC | Age: 42
End: 2022-01-18

## 2022-01-18 DIAGNOSIS — M54.2 CHRONIC NECK PAIN: ICD-10-CM

## 2022-01-18 DIAGNOSIS — S46.819A STRAIN OF TRAPEZIUS MUSCLE, UNSPECIFIED LATERALITY, INITIAL ENCOUNTER: Primary | ICD-10-CM

## 2022-01-18 DIAGNOSIS — G89.29 CHRONIC NECK PAIN: ICD-10-CM

## 2022-01-18 PROCEDURE — 97110 THERAPEUTIC EXERCISES: CPT | Performed by: PHYSICAL THERAPIST

## 2022-01-18 PROCEDURE — 97140 MANUAL THERAPY 1/> REGIONS: CPT | Performed by: PHYSICAL THERAPIST

## 2022-01-18 NOTE — PROGRESS NOTES
Physical Therapy Daily Progress Note      Patient: Bernice ZAMAN   : 1980  Treatment Diagnosis:     ICD-10-CM ICD-9-CM   1. Strain of trapezius muscle, unspecified laterality, initial encounter  S46.819A 840.8   2. Chronic neck pain  M54.2 723.1    G89.29 338.29     Referring practitioner: NEREYDA Sanderson  Date of Initial Visit: Type: THERAPY  Noted: 12/10/2021  Today's Date: 2022  Patient seen for 6 sessions         Bernice ZAMAN reports:     Subjective    Patient reports she felt better after the DN.  Was able to do the band exercises at home yesterday without pain symptoms or provoking a HA.     Objective   See Exercise, Manual, and Modality Logs for complete treatment.       Assessment/Plan  Patient responded positively to manual therapy.  She has less muscle tightness from prior session.  She was able to perform additional strengthening exercises without adverse symptoms.    Progress per Plan of Care           Timed:  Manual Therapy:   13      mins  67267;  Therapeutic Exercise:   20      mins  73497;     Neuromuscular Carol Ann:        mins  47462;    Therapeutic Activity:          mins  78877;     Gait Training:           mins  86305;     Ultrasound:          mins  44780;    Iontophoresis:          mins  84071;  Dry Needling:          mins  41386;  Dry Needling:          mins  77158;    Untimed:  Electrical Stimulation:         mins  30046 ( );  Mechanical Traction:         mins  61271;   Canalith Repositioning:      mins  98667;    Timed Treatment:   33   mins   Total Treatment:     45   mins    Echo Hallman PT  Physical Therapist

## 2022-01-20 ENCOUNTER — TREATMENT (OUTPATIENT)
Dept: PHYSICAL THERAPY | Facility: CLINIC | Age: 42
End: 2022-01-20

## 2022-01-20 DIAGNOSIS — M54.2 CHRONIC NECK PAIN: ICD-10-CM

## 2022-01-20 DIAGNOSIS — G89.29 CHRONIC NECK PAIN: ICD-10-CM

## 2022-01-20 DIAGNOSIS — S46.819A STRAIN OF TRAPEZIUS MUSCLE, UNSPECIFIED LATERALITY, INITIAL ENCOUNTER: Primary | ICD-10-CM

## 2022-01-20 PROCEDURE — 97140 MANUAL THERAPY 1/> REGIONS: CPT | Performed by: PHYSICAL THERAPIST

## 2022-01-20 PROCEDURE — 97110 THERAPEUTIC EXERCISES: CPT | Performed by: PHYSICAL THERAPIST

## 2022-01-20 NOTE — PROGRESS NOTES
Physical Therapy Daily Progress Note      Patient: Bernice ZAMAN   : 1980  Treatment Diagnosis:     ICD-10-CM ICD-9-CM   1. Strain of trapezius muscle, unspecified laterality, initial encounter  S46.819A 840.8   2. Chronic neck pain  M54.2 723.1    G89.29 338.29     Referring practitioner: NEREYDA Sanderson  Date of Initial Visit: Type: THERAPY  Noted: 12/10/2021  Today's Date: 2022  Patient seen for 7 sessions         Bernice ZAMAN reports:     Subjective   Patient reports she has felt better since last session.  States she was able to do her home exercise without increased pain.      Objective   See Exercise, Manual, and Modality Logs for complete treatment.       Assessment/Plan  Patient presents with improved cervical spine mobility and decreased muscle hypertonicity.  She responded positively to manual therapy.  Performed exercises to tolerance with no adverse symptoms.    Progress per Plan of Care and Progress strengthening /stabilization /functional activity           Timed:  Manual Therapy:   16      mins  59867;  Therapeutic Exercise:   20      mins  45200;     Neuromuscular Carol Ann:        mins  46775;    Therapeutic Activity:          mins  36837;     Gait Training:           mins  91005;     Ultrasound:          mins  97443;    Iontophoresis:          mins  14376;  Dry Needling:         mins  71533;  Dry Needling:          mins  05390;    Untimed:  Electrical Stimulation:         mins  44677 ( );  Mechanical Traction:         mins  35355;   Canalith Repositioning:      mins  05031;    Timed Treatment:   36   mins   Total Treatment:     46   mins    Echo Hallman PT  Physical Therapist

## 2022-01-25 ENCOUNTER — TREATMENT (OUTPATIENT)
Dept: PHYSICAL THERAPY | Facility: CLINIC | Age: 42
End: 2022-01-25

## 2022-01-25 DIAGNOSIS — M54.2 CHRONIC NECK PAIN: ICD-10-CM

## 2022-01-25 DIAGNOSIS — S46.819A STRAIN OF TRAPEZIUS MUSCLE, UNSPECIFIED LATERALITY, INITIAL ENCOUNTER: Primary | ICD-10-CM

## 2022-01-25 DIAGNOSIS — G89.29 CHRONIC NECK PAIN: ICD-10-CM

## 2022-01-25 PROCEDURE — 97014 ELECTRIC STIMULATION THERAPY: CPT | Performed by: PHYSICAL THERAPIST

## 2022-01-25 PROCEDURE — 20561 NDL INSJ W/O NJX 3+ MUSC: CPT | Performed by: PHYSICAL THERAPIST

## 2022-01-25 PROCEDURE — 97140 MANUAL THERAPY 1/> REGIONS: CPT | Performed by: PHYSICAL THERAPIST

## 2022-01-25 NOTE — PROGRESS NOTES
Physical Therapy Daily Progress Note      Patient: Bernice ZAMAN   : 1980  Treatment Diagnosis:     ICD-10-CM ICD-9-CM   1. Strain of trapezius muscle, unspecified laterality, initial encounter  S46.819A 840.8   2. Chronic neck pain  M54.2 723.1    G89.29 338.29     Referring practitioner: NEREYDA Sanderson  Date of Initial Visit: Type: THERAPY  Noted: 12/10/2021  Today's Date: 2022  Patient seen for 8 sessions         Bernice ZAMAN reports:     Subjective   Patient reports her pain was on the right side yesterday-needed to lay on ice, which helped.  States she is getting a sensation of impending cramp in the upper left neck when turning head-noticed it a lot when turning head while driving.    Objective   See Exercise, Manual, and Modality Logs for complete treatment.       Assessment/Plan  Soft tissue was assessed at cervical/UQ. PT noted point tenderness as well as palpable trigger points within the muslce tissue. On this date patient stated that they would like to undergo a dry needling procedure for the soft tissue dysfunction. Patient was educated on the procedure for dry needling and consent waver signed/on file. Patient was informed of the risks, possible adverse effects, along with the benefits of DN. Patient responded positively to manual therapy as well as DN.  She had improved muscle tone and decreased TTP.      Progress per Plan of Care           Timed:  Manual Therapy:   13      mins  24521;  Therapeutic Exercise:         mins  78332;     Neuromuscular Carol Ann:        mins  66400;    Therapeutic Activity:          mins  04356;     Gait Training:           mins  40117;     Ultrasound:          mins  24372;    Iontophoresis:          mins  81378;  Dry Needling:          mins  81917;  Dry Needling:    15      mins  55634;    Untimed:  Electrical Stimulation:    15     mins  39924 ( );  Mechanical Traction:         mins  22295;   Canalith Repositioning:      mins   58169;    Timed Treatment:   28   mins   Total Treatment:     50   mins    Echo Hallman, PT  Physical Therapist

## 2022-01-27 ENCOUNTER — TREATMENT (OUTPATIENT)
Dept: PHYSICAL THERAPY | Facility: CLINIC | Age: 42
End: 2022-01-27

## 2022-01-27 DIAGNOSIS — M54.2 CHRONIC NECK PAIN: ICD-10-CM

## 2022-01-27 DIAGNOSIS — S46.819A STRAIN OF TRAPEZIUS MUSCLE, UNSPECIFIED LATERALITY, INITIAL ENCOUNTER: Primary | ICD-10-CM

## 2022-01-27 DIAGNOSIS — G89.29 CHRONIC NECK PAIN: ICD-10-CM

## 2022-01-27 PROCEDURE — 97140 MANUAL THERAPY 1/> REGIONS: CPT | Performed by: PHYSICAL THERAPIST

## 2022-01-27 PROCEDURE — 97110 THERAPEUTIC EXERCISES: CPT | Performed by: PHYSICAL THERAPIST

## 2022-01-27 PROCEDURE — 97014 ELECTRIC STIMULATION THERAPY: CPT | Performed by: PHYSICAL THERAPIST

## 2022-01-27 NOTE — PROGRESS NOTES
Physical Therapy Daily Progress Note      Patient: Bernice ZAMAN   : 1980  Treatment Diagnosis:     ICD-10-CM ICD-9-CM   1. Strain of trapezius muscle, unspecified laterality, initial encounter  S46.819A 840.8   2. Chronic neck pain  M54.2 723.1    G89.29 338.29     Referring practitioner: NEREYDA Sanderson  Date of Initial Visit: Type: THERAPY  Noted: 12/10/2021  Today's Date: 2022  Patient seen for 9 sessions         Bernice ZAMAN reports:     Subjective   Patient reports she had some muscle soreness after the DN but she didn't have the burning headache anymore.  States her neck feels good today.    Objective   See Exercise, Manual, and Modality Logs for complete treatment.       Assessment/Plan  Patient presented with improved cervical muscle tone, some tightness in the OA region and right upper cervical PVM.  She responded positively to manual therapy and was able to perform exercises with progressed parameters.  Progress per Plan of Care and Progress strengthening /stabilization /functional activity           Timed:  Manual Therapy:   12      mins  77591;  Therapeutic Exercise:   20      mins  93905;     Neuromuscular Carol Ann:        mins  29713;    Therapeutic Activity:          mins  59274;     Gait Training:           mins  67908;     Ultrasound:          mins  20234;    Iontophoresis:          mins  48750;  Dry Needling:          mins  03526;  Dry Needling:          mins  50979;    Untimed:  Electrical Stimulation:   15      mins  58693 ( );  Mechanical Traction:         mins  93309;   Canalith Repositioning:      mins  62362;    Timed Treatment:   32   mins   Total Treatment:     50   mins    Echo Hallman PT  Physical Therapist

## 2022-01-27 NOTE — PATIENT INSTRUCTIONS
Access Code: [email or web address]  URL: [email or web address]  Date: 01/27/2022  Prepared by: Echo Hallman    Exercises  Supine Cervical Retraction with Towel - 1 x daily - 7 x weekly - 1 sets - 10 reps - 3 sec. hold  Supine Deep Neck Flexor Training - 1 x daily - 7 x weekly - 1 sets - 10 reps - 3-5 sec. hold  Supine Shoulder Press - 1 x daily - 7 x weekly - 2 sets - 15 reps  Prone Shoulder Row - 1 x daily - 7 x weekly - 2 sets - 15 reps  Gentle Levator Scapulae Stretch - 1 x daily - 7 x weekly - 1 sets - 4 reps - 20 sec. hold  Seated Gentle Upper Trapezius Stretch - 1 x daily - 7 x weekly - 1 sets - 4 reps - 20 sec. hold  Standing Isometric Cervical Flexion with Chin Tucks and Ball at Wall - 1 x daily - 7 x weekly - 1 sets - 10 reps - 5 sec. hold  Standing Isometric Cervical Retraction with Chin Tucks and Ball at Wall - 1 x daily - 7 x weekly - 1 sets - 10 reps - 5 sec. hold  Standing Wall Ball Circles with Mini Swiss Ball - 1 x daily - 7 x weekly - 2 sets - 10 reps  Standing Bilateral Low Shoulder Row with Anchored Resistance - 1 x daily - 7 x weekly - 3 sets - 10 reps  Standing Chest Press with Resistance Band with PLB - 1 x daily - 7 x weekly - 3 sets - 10 reps  Prone Press Up on Elbows - 1 x daily - 7 x weekly - 3 sets - 10 reps  Chao Pose with Blocks - 1 x daily - 7 x weekly - 1 sets - 4 reps - 20 sec. hold

## 2022-02-10 ENCOUNTER — TREATMENT (OUTPATIENT)
Dept: PHYSICAL THERAPY | Facility: CLINIC | Age: 42
End: 2022-02-10

## 2022-02-10 DIAGNOSIS — G89.29 CHRONIC NECK PAIN: ICD-10-CM

## 2022-02-10 DIAGNOSIS — S46.819A STRAIN OF TRAPEZIUS MUSCLE, UNSPECIFIED LATERALITY, INITIAL ENCOUNTER: Primary | ICD-10-CM

## 2022-02-10 DIAGNOSIS — M54.2 CHRONIC NECK PAIN: ICD-10-CM

## 2022-02-10 PROCEDURE — 97140 MANUAL THERAPY 1/> REGIONS: CPT | Performed by: PHYSICAL THERAPIST

## 2022-02-10 PROCEDURE — 97110 THERAPEUTIC EXERCISES: CPT | Performed by: PHYSICAL THERAPIST

## 2022-02-10 PROCEDURE — 97530 THERAPEUTIC ACTIVITIES: CPT | Performed by: PHYSICAL THERAPIST

## 2022-02-10 PROCEDURE — 97014 ELECTRIC STIMULATION THERAPY: CPT | Performed by: PHYSICAL THERAPIST

## 2022-02-10 NOTE — PROGRESS NOTES
Physical Therapy Re-Assessment / Re-Certification        Patient: Bernice Anderson   : 1980  Visit Diagnoses:     ICD-10-CM ICD-9-CM   1. Strain of trapezius muscle, unspecified laterality, initial encounter  S46.819A 840.8   2. Chronic neck pain  M54.2 723.1    G89.29 338.29     Referring practitioner: NEREYDA Sanderson  Date of Initial Visit: Type: THERAPY  Noted: 12/10/2021  Today's Date: 2/10/2022  Patient seen for 10 sessions      Subjective:   Bernice Anderson reports:   Subjective Questionnaire: NDI:  Clinical Progress: improved  Home Program Compliance: Yes  Treatment has included: therapeutic exercise, manual therapy, therapeutic activity, electrical stimulation, dry needling and moist heat    Subjective Evaluation    Pain  At best pain ratin  At worst pain ratin         Patient reports she has been getting relief from sleeping with the neck roll.  Was able to sleep through the night and did not wake up with a HA.  States she has been having a sharp pain in the right scapular region and axillary/side region.  States she did a work out at the gym and had increased pain after the first day back which was relieved with heat, subsequent work outs did not cause increased pain.    Objective          Palpation     Right   Hypertonic in the rhomboids and upper trapezius. Tenderness of the rhomboids.     Tenderness   Cervical Spine   Tenderness in the right transverse process and right ribs/costal cartilage.              Active Range of Motion   Cervical/Thoracic Spine   Cervical     Flexion: 60 degrees   Extension: 85 degrees with end range discomfort  Left lateral flexion: 60 degrees   Right lateral flexion: 60 degrees   Left rotation: 85 degrees   Right rotation: 85 degrees      Strength/Myotome Testing   Cervical Spine   Neck extension: 5  Neck flexion: 5     Left   Neck lateral flexion (C3): 5     Right   Neck lateral flexion (C3): 5    Left Shoulder      Planes of Motion   Abduction:  5   External rotation at 0°: 5     Right Shoulder      Planes of Motion   Abduction: 5   External rotation at 0°: 5      Left Elbow   Flexion: 5  Extension: 5     Right Elbow   Flexion: 5  Extension: 5     Left Wrist/Hand   Wrist extension: 5  Wrist flexion: 5     Right Wrist/Hand   Wrist extension: 5  Wrist flexion: 5     Tests   Cervical   Negative repeated extension.     Left   Negative Spurling's sign.     Additional Tests Details  Cervical spine segmental hypermobility.      Thoracic spine hypomobility and posterior rotated right ribs.       Assessment/Plan  Patient demonstrated improved cervical spine strength.  Her ROM continues to be WNL with some mild discomfort at end range extension.  She is able to perform functional reach/lifting without pain provocation.     Progress toward previous goals: Partially Met    Goal Review  Short Term Goals:  1.  Patient will have increased cervical spine strength to 5/5 to allow for increased spinal stabilization and support.-met  2.  Patient will have normalized tone in left cervical/UQ muscles.-met  3.  Patient will demonstrate proper spinal stabilization with functional reaching and lifting tasks.-met     Long Term Goals (2 wks):  1.  Patient will be independent in performance of HEP for carryover upon discharge from skilled PT services.-met  2.  Patient will have improved NDI score of 0/50.-progressing  3.  Patient will report ability to perform household chores while observing energy conservation and jt protections strategies without increased pain symptoms.-met        Recommendations: Continue as planned  Timeframe: 2 weeks  Prognosis to achieve goals: good    PT SIGNATURE: Echo Hallman PT     License Number: [id number]  Electronically signed by Echo Hallman PT, 02/10/22, 9:12 AM EST    Certification Period: 2/10/2022 thru 5/10/2022  I certify that the therapy services are furnished while this patient is under my care.  The services outlined above are required by  this patient, and will be reviewed every 90 days.    Signature: __________________________________    Stacie Castaneda APRN   NPI: [id number]    Please sign and return via fax to [phone]. Thank you, [facility].      Timed:  Manual Therapy:    13     mins  14611;  Therapeutic Exercise:    10     mins  82537;     Neuromuscular Carol Ann:        mins  72513;    Therapeutic Activity:     10     mins  94722;     Gait Training:           mins  97145;     Ultrasound:          mins  16717;  Iontophoresis:          mins  14904;    Dry Needling:          mins  52121;  Dry Needling:          mins  46980;    Untimed:  Electrical Stimulation:    15     mins  94581 ( );  Mechanical Traction:         mins  32283;   Canalith Repositioning:        mins  58615;    Timed Treatment:   33   mins   Total Treatment:     50   mins

## 2022-02-16 ENCOUNTER — TREATMENT (OUTPATIENT)
Dept: PHYSICAL THERAPY | Facility: CLINIC | Age: 42
End: 2022-02-16

## 2022-02-16 DIAGNOSIS — S46.819A STRAIN OF TRAPEZIUS MUSCLE, UNSPECIFIED LATERALITY, INITIAL ENCOUNTER: Primary | ICD-10-CM

## 2022-02-16 DIAGNOSIS — M54.2 CHRONIC NECK PAIN: ICD-10-CM

## 2022-02-16 DIAGNOSIS — G89.29 CHRONIC NECK PAIN: ICD-10-CM

## 2022-02-16 PROCEDURE — 97530 THERAPEUTIC ACTIVITIES: CPT | Performed by: PHYSICAL THERAPIST

## 2022-02-16 PROCEDURE — 97110 THERAPEUTIC EXERCISES: CPT | Performed by: PHYSICAL THERAPIST

## 2022-02-16 NOTE — PATIENT INSTRUCTIONS
Access Code: [email or web address]  URL: [email or web address]  Date: 02/16/2022  Prepared by: Echo Hallman    Exercises  Supine Cervical Retraction with Towel - 1 x daily - 7 x weekly - 1 sets - 10 reps - 3 sec. hold  Supine Deep Neck Flexor Training - 1 x daily - 7 x weekly - 1 sets - 10 reps - 3-5 sec. hold  Supine Shoulder Press - 1 x daily - 7 x weekly - 2 sets - 15 reps  Prone Shoulder Row - 1 x daily - 7 x weekly - 2 sets - 15 reps  Gentle Levator Scapulae Stretch - 1 x daily - 7 x weekly - 1 sets - 4 reps - 20 sec. hold  Seated Gentle Upper Trapezius Stretch - 1 x daily - 7 x weekly - 1 sets - 4 reps - 20 sec. hold  Standing Isometric Cervical Flexion with Chin Tucks and Ball at Wall - 1 x daily - 7 x weekly - 1 sets - 10 reps - 5 sec. hold  Standing Isometric Cervical Retraction with Chin Tucks and Ball at Wall - 1 x daily - 7 x weekly - 1 sets - 10 reps - 5 sec. hold  Standing Wall Ball Circles with Mini Swiss Ball - 1 x daily - 7 x weekly - 2 sets - 10 reps  Standing Bilateral Low Shoulder Row with Anchored Resistance - 1 x daily - 7 x weekly - 3 sets - 10 reps  Standing Chest Press with Resistance Band with PLB - 1 x daily - 7 x weekly - 3 sets - 10 reps  Prone Press Up on Elbows - 1 x daily - 7 x weekly - 3 sets - 10 reps  Chao Pose with Blocks - 1 x daily - 7 x weekly - 1 sets - 4 reps - 20 sec. hold  Supine Chest Stretch on Foam Roll - 1 x daily - 7 x weekly - 1 sets - 10 reps  Overhead Reach on Foam Roll - 1 x daily - 7 x weekly - 1 sets - 10 reps  Thoracic Mobilization on Foam Roll - 1 x daily - 7 x weekly - 1 sets - 10 reps  Dead Bug on Foam 1/2 Roll - 1 x daily - 7 x weekly - 1 sets - 10 reps  Thoracic Foam Roll Mobilization Bench Press - 1 x daily - 7 x weekly - 1 sets - 10 reps  Supine Knees to Chest with Swiss Ball - 1 x daily - 7 x weekly - 3 sets - 10 reps  Bridge with Hamstring Curl on Swiss Ball - 1 x daily - 7 x weekly - 1 sets - 10 reps  Bridge with Arms at Sides and Feet on Swiss Ball - 1 x  daily - 7 x weekly - 1 sets - 10 reps  Supine Lower Trunk Rotation with Swiss Ball - 1 x daily - 7 x weekly - 1 sets - 10 reps  Supine Thoracic Extension on Swiss Ball - 1 x daily - 7 x weekly - 1 sets - 10 reps  Supine Bridge with Chest Press on Swiss Ball - 1 x daily - 7 x weekly - 1 sets - 10 reps  Curl Up with Shoulders on Swiss Ball - 1 x daily - 7 x weekly - 1 sets - 10 reps  Bird Dog on Swiss Ball - 1 x daily - 7 x weekly - 1 sets - 10 reps  Prone Shoulder Row on Swiss Ball with Dumbbells - 1 x daily - 7 x weekly - 1 sets - 10 reps  Seated Flexion Stretch with Swiss Ball - 1 x daily - 7 x weekly - 1 sets - 5 reps - 10 sec. hold  Seated Thoracic Flexion and Rotation with Swiss Ball - 1 x daily - 7 x weekly - 1 sets - 5 reps - 10 sec. hold  Thoracic Extension with Foam Roll - 1 x daily - 7 x weekly - 1 sets - 10 reps  Lateral Thoracic Extensions with Foam Roll - 1 x daily - 7 x weekly - 1 sets - 10 reps

## 2022-02-16 NOTE — PROGRESS NOTES
PHYSICAL THERAPY DISCHARGE SUMMARY      Patient: Bernice Anderson   : 1980  Diagnosis/ICD-10 Code:  Strain of trapezius muscle, unspecified laterality, initial encounter [S46.819A]  Referring practitioner: NEREYDA Sanderson  Date of Initial Visit: Type: THERAPY  Noted: 12/10/2021  Today's Date: 2022  Patient seen for 11 sessions      Subjective:   Bernice Anderson reports:   Subjective Questionnaire: NDI:0/50  Clinical Progress: improved  Home Program Compliance: Yes  Treatment has included: therapeutic exercise, manual therapy, therapeutic activity, ultrasound, dry needling, moist heat and cryotherapy    Subjective  Patient reports her neck has been feeling better.  Has been able to exercise without pain.      Objective     Palpation   No TTP, normal muscle tone.       Active Range of Motion   Cervical/Thoracic Spine   Cervical     Flexion: 60 degrees   Extension: 85 degrees   Left lateral flexion: 60 degrees   Right lateral flexion: 60 degrees   Left rotation: 85 degrees   Right rotation: 85 degrees      Strength/Myotome Testing   Cervical Spine   Neck extension: 5  Neck flexion: 5     Left   Neck lateral flexion (C3): 5     Right   Neck lateral flexion (C3): 5    Left Shoulder      Planes of Motion   Abduction: 5   External rotation at 0°: 5     Right Shoulder      Planes of Motion   Abduction: 5   External rotation at 0°: 5      Left Elbow   Flexion: 5  Extension: 5     Right Elbow   Flexion: 5  Extension: 5     Left Wrist/Hand   Wrist extension: 5  Wrist flexion: 5     Right Wrist/Hand   Wrist extension: 5  Wrist flexion: 5     Tests   Cervical   Negative repeated extension.     Left   Negative Spurling's sign.     Additional Tests Details  Cervical spine segmental hypermobility.      Thoracic spine and rib mobility/alignment WNLs    Assessment/Plan   Patient presents with resolved pain symptoms, normal muscle tone, normal ROM and strength.  She has been able to return to PLOF without pain.  She  is independent in HEP.  Progressed HEP with alternative exercise techniques to promote spinal protection.  Progress toward previous goals: All Met    Goal Review  Short Term Goals:  1.  Patient will have increased cervical spine strength to 5/5 to allow for increased spinal stabilization and support.-met  2.  Patient will have normalized tone in left cervical/UQ muscles.-met  3.  Patient will demonstrate proper spinal stabilization with functional reaching and lifting tasks.-met     Long Term Goals:  1.  Patient will be independent in performance of HEP for carryover upon discharge from skilled PT services.-met  2.  Patient will have improved NDI score of 0/50.-met  3.  Patient will report ability to perform household chores while observing energy conservation and jt protections strategies without increased pain symptoms.-met         Recommendations: Discharge to HEP    PT SIGNATURE: Echo Hallman PT     License Number: [id number]  Electronically signed by Echo Hallman PT, 02/16/22, 8:54 AM EST    Timed:  Manual Therapy:         mins  54189;  Therapeutic Exercise:   30      mins  12020;     Neuromuscular Carol Ann:        mins  71080;    Therapeutic Activity:    10      mins  67013;     Gait Training:           mins  56335;     Ultrasound:          mins  66304;    Iontophoresis:          mins  35985;  Dry Needling:          mins  96164;  Dry Needling:          mins  22795;    Untimed:  Electrical Stimulation:         mins  12960 ( );  Mechanical Traction:         mins  24344;   Canalith Repositioning:      mins  48950;    Timed Treatment:   40   mins   Total Treatment:     40   mins

## 2022-05-16 ENCOUNTER — OFFICE VISIT (OUTPATIENT)
Dept: FAMILY MEDICINE CLINIC | Facility: CLINIC | Age: 42
End: 2022-05-16

## 2022-05-16 VITALS
RESPIRATION RATE: 18 BRPM | OXYGEN SATURATION: 98 % | DIASTOLIC BLOOD PRESSURE: 66 MMHG | SYSTOLIC BLOOD PRESSURE: 102 MMHG | BODY MASS INDEX: 23.05 KG/M2 | WEIGHT: 135 LBS | HEIGHT: 64 IN | TEMPERATURE: 97.5 F | HEART RATE: 65 BPM

## 2022-05-16 DIAGNOSIS — R05.9 COUGH: ICD-10-CM

## 2022-05-16 DIAGNOSIS — J06.9 ACUTE URI: Primary | ICD-10-CM

## 2022-05-16 PROCEDURE — 99212 OFFICE O/P EST SF 10 MIN: CPT | Performed by: NURSE PRACTITIONER

## 2022-05-16 RX ORDER — BENZONATATE 100 MG/1
100 CAPSULE ORAL 3 TIMES DAILY PRN
Qty: 30 CAPSULE | Refills: 0 | Status: SHIPPED | OUTPATIENT
Start: 2022-05-16 | End: 2023-02-10

## 2022-05-16 RX ORDER — PREDNISONE 20 MG/1
20 TABLET ORAL 2 TIMES DAILY
Qty: 8 TABLET | Refills: 0 | Status: SHIPPED | OUTPATIENT
Start: 2022-05-16 | End: 2022-05-20

## 2022-05-16 NOTE — PATIENT INSTRUCTIONS
Drink plenty of fluids-water preferably, eat a heart healthy diet, get plenty of sleep and do warm salt water gargles twice a day until feeling better; continue to use albuterol inhaler prn. Pt verb. Understanding.

## 2022-05-16 NOTE — PROGRESS NOTES
"Subjective     Bernice Anderson is a 41 y.o.. female.     Home covid test done on Tuesday, Thursday and Saturday and all negative    URI   This is a new problem. Episode onset: 8 days. There has been no fever. Associated symptoms include congestion, coughing (4 days), ear pain (RESOLVED), nausea (resolved), rhinorrhea and a sore throat (resolved). Pertinent negatives include no diarrhea, headaches or vomiting. Treatments tried: nightquil.       The following portions of the patient's history were reviewed and updated as appropriate: allergies, current medications, past family history, past medical history, past social history, past surgical history and problem list.    Past Medical History:   Diagnosis Date   • Acne    • Amenorrhea        Past Surgical History:   Procedure Laterality Date   • COLONOSCOPY     • HEMORRHOIDECTOMY         Review of Systems   Constitutional: Negative for fever.   HENT: Positive for congestion, ear pain (RESOLVED), postnasal drip, rhinorrhea and sore throat (resolved).    Respiratory: Positive for cough (4 days). Negative for shortness of breath.    Gastrointestinal: Positive for nausea (resolved). Negative for diarrhea and vomiting.   Musculoskeletal: Negative for arthralgias.   Neurological: Negative for dizziness and headaches.       No Known Allergies    Objective     Vitals:    05/16/22 0922   BP: 102/66   Pulse: 65   Resp: 18   Temp: 97.5 °F (36.4 °C)   TempSrc: Oral   SpO2: 98%   Weight: 61.2 kg (135 lb)   Height: 162.6 cm (64.02\")     Body mass index is 23.16 kg/m².    Physical Exam  Vitals reviewed.   Constitutional:       Appearance: She is well-developed.   HENT:      Head: Normocephalic and atraumatic.      Right Ear: Tympanic membrane normal. Tympanic membrane is not erythematous.      Left Ear: Tympanic membrane normal. Tympanic membrane is not erythematous.      Nose: Nose normal.      Right Sinus: No maxillary sinus tenderness or frontal sinus tenderness.      Left Sinus: " No maxillary sinus tenderness or frontal sinus tenderness.      Mouth/Throat:      Mouth: Mucous membranes are moist.      Pharynx: Oropharyngeal exudate (clear pnd) present. No pharyngeal swelling or posterior oropharyngeal erythema.   Eyes:      Conjunctiva/sclera: Conjunctivae normal.      Pupils: Pupils are equal, round, and reactive to light.   Cardiovascular:      Rate and Rhythm: Normal rate and regular rhythm.      Heart sounds: No murmur heard.  Pulmonary:      Effort: Pulmonary effort is normal. No accessory muscle usage or respiratory distress.      Breath sounds: Normal breath sounds. No stridor. No decreased breath sounds, wheezing, rhonchi or rales.   Musculoskeletal:         General: Normal range of motion.   Lymphadenopathy:      Cervical: No cervical adenopathy.   Skin:     General: Skin is warm and dry.   Neurological:      Mental Status: She is alert and oriented to person, place, and time.           Current Outpatient Medications:   •  ALBUTEROL SULFATE  (90 Base) MCG/ACT inhaler, INHALE 1 PUFF EVERY 6 (SIX) HOURS AS NEEDED FOR WHEEZING OR SHORTNESS OF AIR (COUGHING EPISODES)., Disp: 8.5 inhaler, Rfl: 2  •  benzonatate (Tessalon Perles) 100 MG capsule, Take 1 capsule by mouth 3 (Three) Times a Day As Needed for Cough., Disp: 30 capsule, Rfl: 0  •  predniSONE (DELTASONE) 20 MG tablet, Take 1 tablet by mouth 2 (Two) Times a Day for 4 days., Disp: 8 tablet, Rfl: 0        Diagnoses and all orders for this visit:    1. Acute URI (Primary)    2. Cough  -     predniSONE (DELTASONE) 20 MG tablet; Take 1 tablet by mouth 2 (Two) Times a Day for 4 days.  Dispense: 8 tablet; Refill: 0  -     benzonatate (Tessalon Perles) 100 MG capsule; Take 1 capsule by mouth 3 (Three) Times a Day As Needed for Cough.  Dispense: 30 capsule; Refill: 0        Patient Instructions   Drink plenty of fluids-water preferably, eat a heart healthy diet, get plenty of sleep and do warm salt water gargles twice a day until  feeling better; continue to use albuterol inhaler prn. Pt verb. Understanding.       Return if symptoms worsen or fail to improve.

## 2023-02-08 DIAGNOSIS — Z00.00 WELL ADULT EXAM: Primary | ICD-10-CM

## 2023-02-09 LAB
ALBUMIN SERPL-MCNC: 5.1 G/DL (ref 3.5–5.2)
ALBUMIN/GLOB SERPL: 2.6 G/DL
ALP SERPL-CCNC: 44 U/L (ref 39–117)
ALT SERPL-CCNC: 16 U/L (ref 1–33)
APPEARANCE UR: ABNORMAL
AST SERPL-CCNC: 19 U/L (ref 1–32)
BACTERIA #/AREA URNS HPF: ABNORMAL /HPF
BASOPHILS # BLD AUTO: 0.04 10*3/MM3 (ref 0–0.2)
BASOPHILS NFR BLD AUTO: 0.8 % (ref 0–1.5)
BILIRUB SERPL-MCNC: 1.1 MG/DL (ref 0–1.2)
BILIRUB UR QL STRIP: NEGATIVE
BUN SERPL-MCNC: 14 MG/DL (ref 6–20)
BUN/CREAT SERPL: 17.7 (ref 7–25)
CALCIUM SERPL-MCNC: 9.5 MG/DL (ref 8.6–10.5)
CASTS URNS MICRO: ABNORMAL
CHLORIDE SERPL-SCNC: 104 MMOL/L (ref 98–107)
CHOLEST SERPL-MCNC: 214 MG/DL (ref 0–200)
CHOLEST/HDLC SERPL: 2.14 {RATIO}
CO2 SERPL-SCNC: 22 MMOL/L (ref 22–29)
COLOR UR: ABNORMAL
CREAT SERPL-MCNC: 0.79 MG/DL (ref 0.57–1)
CRYSTALS URNS MICRO: ABNORMAL
EGFRCR SERPLBLD CKD-EPI 2021: 97.1 ML/MIN/1.73
EOSINOPHIL # BLD AUTO: 0.08 10*3/MM3 (ref 0–0.4)
EOSINOPHIL NFR BLD AUTO: 1.6 % (ref 0.3–6.2)
EPI CELLS #/AREA URNS HPF: ABNORMAL /HPF
ERYTHROCYTE [DISTWIDTH] IN BLOOD BY AUTOMATED COUNT: 11.9 % (ref 12.3–15.4)
GLOBULIN SER CALC-MCNC: 2 GM/DL
GLUCOSE SERPL-MCNC: 54 MG/DL (ref 65–99)
GLUCOSE UR QL STRIP: NEGATIVE
HCT VFR BLD AUTO: 43.2 % (ref 34–46.6)
HDLC SERPL-MCNC: 100 MG/DL (ref 40–60)
HGB BLD-MCNC: 14.1 G/DL (ref 12–15.9)
HGB UR QL STRIP: NEGATIVE
IMM GRANULOCYTES # BLD AUTO: 0.01 10*3/MM3 (ref 0–0.05)
IMM GRANULOCYTES NFR BLD AUTO: 0.2 % (ref 0–0.5)
KETONES UR QL STRIP: ABNORMAL
LDLC SERPL CALC-MCNC: 104 MG/DL (ref 0–100)
LEUKOCYTE ESTERASE UR QL STRIP: ABNORMAL
LYMPHOCYTES # BLD AUTO: 0.88 10*3/MM3 (ref 0.7–3.1)
LYMPHOCYTES NFR BLD AUTO: 17.1 % (ref 19.6–45.3)
MCH RBC QN AUTO: 29.9 PG (ref 26.6–33)
MCHC RBC AUTO-ENTMCNC: 32.6 G/DL (ref 31.5–35.7)
MCV RBC AUTO: 91.7 FL (ref 79–97)
MONOCYTES # BLD AUTO: 0.26 10*3/MM3 (ref 0.1–0.9)
MONOCYTES NFR BLD AUTO: 5.1 % (ref 5–12)
NEUTROPHILS # BLD AUTO: 3.87 10*3/MM3 (ref 1.7–7)
NEUTROPHILS NFR BLD AUTO: 75.2 % (ref 42.7–76)
NITRITE UR QL STRIP: NEGATIVE
NRBC BLD AUTO-RTO: 0 /100 WBC (ref 0–0.2)
PH UR STRIP: 5.5 [PH] (ref 5–8)
PLATELET # BLD AUTO: 234 10*3/MM3 (ref 140–450)
POTASSIUM SERPL-SCNC: 4.6 MMOL/L (ref 3.5–5.2)
PROT SERPL-MCNC: 7.1 G/DL (ref 6–8.5)
PROT UR QL STRIP: ABNORMAL
RBC # BLD AUTO: 4.71 10*6/MM3 (ref 3.77–5.28)
RBC #/AREA URNS HPF: ABNORMAL /HPF
SODIUM SERPL-SCNC: 139 MMOL/L (ref 136–145)
SP GR UR STRIP: 1.02 (ref 1–1.03)
TRIGL SERPL-MCNC: 54 MG/DL (ref 0–150)
UROBILINOGEN UR STRIP-MCNC: ABNORMAL MG/DL
VLDLC SERPL CALC-MCNC: 10 MG/DL (ref 5–40)
WBC # BLD AUTO: 5.14 10*3/MM3 (ref 3.4–10.8)
WBC #/AREA URNS HPF: ABNORMAL /HPF

## 2023-02-10 ENCOUNTER — OFFICE VISIT (OUTPATIENT)
Dept: FAMILY MEDICINE CLINIC | Facility: CLINIC | Age: 43
End: 2023-02-10
Payer: COMMERCIAL

## 2023-02-10 VITALS
OXYGEN SATURATION: 99 % | SYSTOLIC BLOOD PRESSURE: 108 MMHG | HEART RATE: 52 BPM | TEMPERATURE: 97.1 F | BODY MASS INDEX: 20.14 KG/M2 | DIASTOLIC BLOOD PRESSURE: 68 MMHG | HEIGHT: 64 IN | WEIGHT: 118 LBS

## 2023-02-10 DIAGNOSIS — R82.998 OTHER ABNORMAL FINDINGS IN URINE: Primary | ICD-10-CM

## 2023-02-10 DIAGNOSIS — Z00.00 ANNUAL PHYSICAL EXAM: ICD-10-CM

## 2023-02-10 DIAGNOSIS — Z00.00 ANNUAL PHYSICAL EXAM: Primary | ICD-10-CM

## 2023-02-10 LAB
BILIRUB BLD-MCNC: NEGATIVE MG/DL
CLARITY, POC: CLEAR
COLOR UR: YELLOW
EXPIRATION DATE: ABNORMAL
GLUCOSE UR STRIP-MCNC: NEGATIVE MG/DL
KETONES UR QL: NEGATIVE
LEUKOCYTE EST, POC: NEGATIVE
Lab: ABNORMAL
NITRITE UR-MCNC: NEGATIVE MG/ML
PH UR: 6 [PH] (ref 5–8)
PROT UR STRIP-MCNC: NEGATIVE MG/DL
RBC # UR STRIP: NEGATIVE /UL
SP GR UR: 1.01 (ref 1–1.03)
UROBILINOGEN UR QL: ABNORMAL

## 2023-02-10 PROCEDURE — 99396 PREV VISIT EST AGE 40-64: CPT | Performed by: INTERNAL MEDICINE

## 2023-02-10 PROCEDURE — 81003 URINALYSIS AUTO W/O SCOPE: CPT | Performed by: INTERNAL MEDICINE

## 2023-02-10 NOTE — PROGRESS NOTES
Subjective   Bernice Anderson is a 42 y.o. female. Patient is here today for   Chief Complaint   Patient presents with   • Annual Exam          Vitals:    02/10/23 1058   BP: 108/68   Pulse: 52   Temp: 97.1 °F (36.2 °C)   SpO2: 99%     Body mass index is 20.25 kg/m².      Past Medical History:   Diagnosis Date   • Acne    • Amenorrhea       No Known Allergies   Social History     Socioeconomic History   • Marital status:    Tobacco Use   • Smoking status: Never   • Smokeless tobacco: Never   Substance and Sexual Activity   • Alcohol use: Yes   • Drug use: No   • Sexual activity: Defer        Current Outpatient Medications:   •  ALBUTEROL SULFATE  (90 Base) MCG/ACT inhaler, INHALE 1 PUFF EVERY 6 (SIX) HOURS AS NEEDED FOR WHEEZING OR SHORTNESS OF AIR (COUGHING EPISODES)., Disp: 8.5 inhaler, Rfl: 2     Objective     History of Present Illness  This pleasant 42-year-old woman is here for a comprehensive physical exam.    She follows up with gynecology regularly.  About once every 2 to 3 years depending upon when she is asked to follow-up.    She drinks 2 or 3 alcoholic beverages a month.    She does not smoke cigarettes.    She gets regular overly vigorous aerobic activity.    She is a  mother of 1.  Her daughter is 10 years old.       Review of Systems   Constitutional: Negative.    HENT: Negative.    Eyes: Negative.    Respiratory: Negative.    Cardiovascular: Negative.    Gastrointestinal: Negative.    Endocrine: Negative.    Genitourinary: Negative.    Musculoskeletal: Negative.    Skin: Negative.    Allergic/Immunologic: Negative.    Neurological: Negative.    Hematological: Negative.    Psychiatric/Behavioral: Negative.        Physical Exam  Vitals and nursing note reviewed.   Constitutional:       General: She is not in acute distress.     Appearance: Normal appearance. She is normal weight. She is not ill-appearing, toxic-appearing or diaphoretic.      Comments: Pleasant, neatly groomed, no  distress.   HENT:      Head: Normocephalic.      Right Ear: Tympanic membrane, ear canal and external ear normal. There is no impacted cerumen.      Left Ear: Tympanic membrane, ear canal and external ear normal. There is no impacted cerumen.      Nose: Nose normal.      Mouth/Throat:      Mouth: Mucous membranes are moist.      Pharynx: No oropharyngeal exudate or posterior oropharyngeal erythema.   Eyes:      General: No scleral icterus.        Right eye: No discharge.         Left eye: No discharge.      Extraocular Movements: Extraocular movements intact.      Pupils: Pupils are equal, round, and reactive to light.   Neck:      Vascular: No carotid bruit.   Cardiovascular:      Rate and Rhythm: Normal rate and regular rhythm.      Heart sounds: Normal heart sounds. No murmur heard.    No gallop.   Pulmonary:      Effort: Pulmonary effort is normal. No respiratory distress.      Breath sounds: Normal breath sounds. No wheezing or rales.   Genitourinary:     Comments: Deferred to gynecology.  Musculoskeletal:         General: No swelling, tenderness, deformity or signs of injury.      Right lower leg: No edema.      Left lower leg: No edema.   Skin:     General: Skin is warm and dry.      Coloration: Skin is not jaundiced.      Findings: No bruising or lesion.   Neurological:      General: No focal deficit present.      Mental Status: She is alert and oriented to person, place, and time.      Gait: Gait normal.      Deep Tendon Reflexes: Reflexes normal.   Psychiatric:         Mood and Affect: Mood normal.         Behavior: Behavior normal.         Thought Content: Thought content normal.           Problems Addressed this Visit        Health Encounters    Annual physical exam   Other Visit Diagnoses     Other abnormal findings in urine    -  Primary    Relevant Orders    Urinalysis With Microscopic - Urine, Clean Catch      Diagnoses       Codes Comments    Other abnormal findings in urine    -  Primary ICD-10-CM:  R82.998  ICD-9-CM: 791.9     Annual physical exam     ICD-10-CM: Z00.00  ICD-9-CM: V70.0             PLAN  She and I reviewed her labs.    She enjoys excellent health.    Her urine analysis was abnormal.  She had some protein, some leukocytes, nonrenal epithelial cells and bacteria.  Would like to recheck a urinalysis.    I asked her to follow-up in a year or 2.      No follow-ups on file.

## 2023-03-13 ENCOUNTER — OFFICE VISIT (OUTPATIENT)
Dept: FAMILY MEDICINE CLINIC | Facility: CLINIC | Age: 43
End: 2023-03-13
Payer: COMMERCIAL

## 2023-03-13 ENCOUNTER — TELEPHONE (OUTPATIENT)
Dept: FAMILY MEDICINE CLINIC | Facility: CLINIC | Age: 43
End: 2023-03-13

## 2023-03-13 DIAGNOSIS — J06.9 UPPER RESPIRATORY TRACT INFECTION DUE TO COVID-19 VIRUS: Primary | ICD-10-CM

## 2023-03-13 DIAGNOSIS — U07.1 UPPER RESPIRATORY TRACT INFECTION DUE TO COVID-19 VIRUS: Primary | ICD-10-CM

## 2023-03-13 PROCEDURE — 99442 PR PHYS/QHP TELEPHONE EVALUATION 11-20 MIN: CPT | Performed by: STUDENT IN AN ORGANIZED HEALTH CARE EDUCATION/TRAINING PROGRAM

## 2023-03-13 RX ORDER — AZITHROMYCIN 250 MG/1
TABLET, FILM COATED ORAL
Qty: 6 TABLET | Refills: 0 | Status: SHIPPED | OUTPATIENT
Start: 2023-03-13 | End: 2023-03-29

## 2023-03-13 NOTE — TELEPHONE ENCOUNTER
"Caller: Bernice Anderson \"Bernice Anderson\"    Relationship to patient: Self    Best call back number: [phone] (Mobile)    Date of positive COVID19 test: Saturday     Date if possible COVID19 exposure: NO KNOWN     COVID19 symptoms: COUGHING, ACHES AND PAINS, RUNNY NOSE, THROAT IS SCRATCHY BUT NOT SORE, JOINTS HURT BADLY     Date of initial quarantine: Saturday     Additional information or concerns: PATIENT ASKING FOR PAXLOVID PLEASE NOTIFY OF DECISION     What is the patients preferred pharmacy: [facility]/pharmacy #[id number] [place], [place] - [address] AT [facility] - [phone] [facility] [phone]   [phone]  "

## 2023-03-13 NOTE — PROGRESS NOTES
"You have chosen to receive care through a telephone visit. Do you consent to use a telephone visit for your medical care today? Yes    Patient location::at her residence  Physician Location::: at her office, [address] [place] [address] , [place] [address]        Chief Complaint  No chief complaint on file.    Subjective        Bernice Anderson presents to [facility]  History of Present Illness    Objective   Vital Signs:  There were no vitals taken for this visit.  Estimated body mass index is 20.25 kg/m² as calculated from the following:    Height as of 2/10/23: 162.6 cm (64\").    Weight as of 2/10/23: 53.5 kg (118 lb).       BMI is within normal parameters. No other follow-up for BMI required.      Physical Exam  Neurological:      Mental Status: She is alert and oriented to person, place, and time.        Result Review :                   Assessment and Plan   Diagnoses and all orders for this visit:    1. Upper respiratory tract infection due to COVID-19 virus (Primary)  -     azithromycin (Zithromax Z-Steven) 250 MG tablet; Take 2 tablets by mouth on day 1, then 1 tablet daily on days 2-5  Dispense: 6 tablet; Refill: 0    Advised to take Z-Steven.  Can take Tylenol/ibuprofen as needed for body aches.  Warning symptoms like shortness of breath, using neck muscles for breathing, unable to speak in full sentences should not be ignored and patient should go immediately to ER for further evaluation.         I spent 15 minutes caring for Bernice on this date of service. This time includes time spent by me in the following activities:preparing for the visit, reviewing tests, obtaining and/or reviewing a separately obtained history, performing a medically appropriate examination and/or evaluation , counseling and educating the patient/family/caregiver, ordering medications, tests, or procedures, referring and communicating with other health care professionals , documenting information in the medical " record, independently interpreting results and communicating that information with the patient/family/caregiver and care coordination  Follow Up   No follow-ups on file.  Patient was given instructions and counseling regarding her condition or for health maintenance advice. Please see specific information pulled into the AVS if appropriate.

## 2023-03-14 DIAGNOSIS — J45.991 ASTHMA, COUGH VARIANT: ICD-10-CM

## 2023-03-16 RX ORDER — ALBUTEROL SULFATE 90 UG/1
1 AEROSOL, METERED RESPIRATORY (INHALATION) EVERY 6 HOURS PRN
Qty: 18 G | Refills: 1 | Status: SHIPPED | OUTPATIENT
Start: 2023-03-16

## 2023-03-24 ENCOUNTER — OFFICE VISIT (OUTPATIENT)
Dept: FAMILY MEDICINE CLINIC | Facility: CLINIC | Age: 43
End: 2023-03-24
Payer: COMMERCIAL

## 2023-03-24 VITALS
DIASTOLIC BLOOD PRESSURE: 70 MMHG | RESPIRATION RATE: 16 BRPM | SYSTOLIC BLOOD PRESSURE: 90 MMHG | HEART RATE: 53 BPM | OXYGEN SATURATION: 97 % | TEMPERATURE: 98.5 F

## 2023-03-24 DIAGNOSIS — R05.9 COUGH, UNSPECIFIED TYPE: Primary | ICD-10-CM

## 2023-03-24 DIAGNOSIS — J06.9 UPPER RESPIRATORY TRACT INFECTION DUE TO COVID-19 VIRUS: Primary | ICD-10-CM

## 2023-03-24 DIAGNOSIS — U07.1 UPPER RESPIRATORY TRACT INFECTION DUE TO COVID-19 VIRUS: Primary | ICD-10-CM

## 2023-03-24 LAB
EXPIRATION DATE: NORMAL
FLUAV AG UPPER RESP QL IA.RAPID: NOT DETECTED
FLUBV AG UPPER RESP QL IA.RAPID: NOT DETECTED
INTERNAL CONTROL: NORMAL
Lab: NORMAL
SARS-COV-2 AG UPPER RESP QL IA.RAPID: NOT DETECTED

## 2023-03-24 PROCEDURE — 87428 SARSCOV & INF VIR A&B AG IA: CPT | Performed by: STUDENT IN AN ORGANIZED HEALTH CARE EDUCATION/TRAINING PROGRAM

## 2023-03-24 RX ORDER — CETIRIZINE HYDROCHLORIDE 5 MG/1
5 TABLET ORAL DAILY
Qty: 90 TABLET | Refills: 1 | Status: SHIPPED | OUTPATIENT
Start: 2023-03-24

## 2023-03-27 ENCOUNTER — TELEPHONE (OUTPATIENT)
Dept: FAMILY MEDICINE CLINIC | Facility: CLINIC | Age: 43
End: 2023-03-27

## 2023-03-27 NOTE — TELEPHONE ENCOUNTER
"Caller: Bernice Anderson \"Bernice Anderson\"    Relationship: Self    Best call back number: [phone]    What medication are you requesting: STEROIDS     What are your current symptoms: COUGH,CONEGSTION,FLEM     How long have you been experiencing symptoms: 4 DAYS    Have you had these symptoms before:    [x] Yes  [] No    Have you been treated for these symptoms before:   [x] Yes  [] No    If a prescription is needed, what is your preferred pharmacy and phone number:    [facility]/pharmacy #[id number] - [place], [place] - [address] AT [facility] [phone] [facility] [phone]         Additional notes: PATIENT WAS BEEN ON Friday, BUT SINCE THEN SHE HAS HAD A WORSE COUGH, AND NOW HAS FLEM     "

## 2023-03-27 NOTE — TELEPHONE ENCOUNTER
Pt called and I informed  that per Dr. Shultz's recommendation that she needs to be seen for the change in cough. Since the cough has gone from dry to productive she needs to be reevaluated.

## 2023-03-29 ENCOUNTER — OFFICE VISIT (OUTPATIENT)
Dept: FAMILY MEDICINE CLINIC | Facility: CLINIC | Age: 43
End: 2023-03-29
Payer: COMMERCIAL

## 2023-03-29 VITALS
WEIGHT: 130 LBS | BODY MASS INDEX: 22.2 KG/M2 | HEART RATE: 57 BPM | OXYGEN SATURATION: 98 % | HEIGHT: 64 IN | SYSTOLIC BLOOD PRESSURE: 102 MMHG | TEMPERATURE: 97.9 F | DIASTOLIC BLOOD PRESSURE: 68 MMHG

## 2023-03-29 DIAGNOSIS — J45.991 ASTHMA, COUGH VARIANT: Primary | ICD-10-CM

## 2023-03-29 PROCEDURE — 99213 OFFICE O/P EST LOW 20 MIN: CPT | Performed by: INTERNAL MEDICINE

## 2023-03-29 RX ORDER — BENZONATATE 100 MG/1
100 CAPSULE ORAL 3 TIMES DAILY PRN
Qty: 30 CAPSULE | Refills: 1 | Status: SHIPPED | OUTPATIENT
Start: 2023-03-29
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
## 2023-03-30 DIAGNOSIS — R05.9 COUGH, UNSPECIFIED TYPE: Primary | ICD-10-CM

## 2023-04-03 LAB
BACTERIA SPT CULT: NORMAL
BACTERIA SPT CULT: NORMAL
GRAM STN SPT: NORMAL
SQUAMOUS SPT QL MICRO: NORMAL
WBC SPT QL MICRO: NORMAL

## 2023-05-02 ENCOUNTER — APPOINTMENT (OUTPATIENT)
Dept: WOMENS IMAGING | Facility: HOSPITAL | Age: 43
End: 2023-05-02
Payer: COMMERCIAL

## 2023-05-02 PROCEDURE — 77065 DX MAMMO INCL CAD UNI: CPT | Performed by: RADIOLOGY

## 2023-05-02 PROCEDURE — 77061 BREAST TOMOSYNTHESIS UNI: CPT | Performed by: RADIOLOGY

## 2023-05-02 PROCEDURE — 76642 ULTRASOUND BREAST LIMITED: CPT | Performed by: RADIOLOGY

## 2023-05-02 PROCEDURE — G0279 TOMOSYNTHESIS, MAMMO: HCPCS | Performed by: RADIOLOGY

## 2023-05-08 ENCOUNTER — OFFICE VISIT (OUTPATIENT)
Dept: FAMILY MEDICINE CLINIC | Facility: CLINIC | Age: 43
End: 2023-05-08
Payer: COMMERCIAL

## 2023-05-08 VITALS
BODY MASS INDEX: 21.68 KG/M2 | SYSTOLIC BLOOD PRESSURE: 102 MMHG | HEIGHT: 64 IN | DIASTOLIC BLOOD PRESSURE: 68 MMHG | WEIGHT: 127 LBS | HEART RATE: 55 BPM | OXYGEN SATURATION: 98 %

## 2023-05-08 DIAGNOSIS — J20.9 ACUTE BRONCHITIS, UNSPECIFIED ORGANISM: Primary | ICD-10-CM

## 2023-05-08 PROCEDURE — 99213 OFFICE O/P EST LOW 20 MIN: CPT | Performed by: INTERNAL MEDICINE

## 2023-05-08 RX ORDER — AZITHROMYCIN 250 MG/1
TABLET, FILM COATED ORAL
Qty: 6 TABLET | Refills: 0 | Status: SHIPPED | OUTPATIENT
Start: 2023-05-08

## 2023-05-08 RX ORDER — BENZONATATE 200 MG/1
200 CAPSULE ORAL 3 TIMES DAILY PRN
Qty: 30 CAPSULE | Refills: 1 | Status: SHIPPED | OUTPATIENT
Start: 2023-05-08
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
## 2023-06-07 ENCOUNTER — OFFICE VISIT (OUTPATIENT)
Dept: FAMILY MEDICINE CLINIC | Facility: CLINIC | Age: 43
End: 2023-06-07
Payer: COMMERCIAL

## 2023-06-07 VITALS
RESPIRATION RATE: 16 BRPM | TEMPERATURE: 98.2 F | OXYGEN SATURATION: 98 % | SYSTOLIC BLOOD PRESSURE: 110 MMHG | HEIGHT: 64 IN | WEIGHT: 132 LBS | BODY MASS INDEX: 22.53 KG/M2 | DIASTOLIC BLOOD PRESSURE: 60 MMHG | HEART RATE: 54 BPM

## 2023-06-07 DIAGNOSIS — B96.89 PROTRACTED BACTERIAL BRONCHITIS: ICD-10-CM

## 2023-06-07 DIAGNOSIS — R05.3 CHRONIC COUGH: Primary | ICD-10-CM

## 2023-06-07 DIAGNOSIS — J42 PROTRACTED BACTERIAL BRONCHITIS: ICD-10-CM

## 2023-06-07 PROCEDURE — 99213 OFFICE O/P EST LOW 20 MIN: CPT | Performed by: INTERNAL MEDICINE

## 2023-06-07 RX ORDER — CIPROFLOXACIN 500 MG/1
500 TABLET, FILM COATED ORAL 2 TIMES DAILY
Qty: 14 TABLET | Refills: 0 | Status: SHIPPED | OUTPATIENT
Start: 2023-06-07

## 2023-06-08 ENCOUNTER — HOSPITAL ENCOUNTER (OUTPATIENT)
Dept: GENERAL RADIOLOGY | Facility: HOSPITAL | Age: 43
Discharge: HOME | End: 2023-06-08
Admitting: INTERNAL MEDICINE
Payer: COMMERCIAL

## 2023-06-08 DIAGNOSIS — R05.3 CHRONIC COUGH: ICD-10-CM

## 2023-06-08 PROCEDURE — 71046 X-RAY EXAM CHEST 2 VIEWS: CPT

## 2023-06-09 PROBLEM — R05.3 CHRONIC COUGH: Status: ACTIVE | Noted: 2023-06-09

## 2023-06-09 PROBLEM — B96.89 PROTRACTED BACTERIAL BRONCHITIS: Status: ACTIVE | Noted: 2023-06-09

## 2023-06-09 PROBLEM — J42 PROTRACTED BACTERIAL BRONCHITIS: Status: ACTIVE | Noted: 2023-06-09
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
## 2023-06-12 LAB
BACTERIA SPT CULT: NORMAL
BACTERIA SPT CULT: NORMAL
GRAM STN SPT: NORMAL
GRAM STN SPT: NORMAL
SQUAMOUS SPT QL MICRO: NORMAL
WBC SPT QL MICRO: NORMAL